# Patient Record
Sex: MALE | Race: BLACK OR AFRICAN AMERICAN | Employment: FULL TIME | URBAN - METROPOLITAN AREA
[De-identification: names, ages, dates, MRNs, and addresses within clinical notes are randomized per-mention and may not be internally consistent; named-entity substitution may affect disease eponyms.]

---

## 2017-06-05 ENCOUNTER — TELEPHONE (OUTPATIENT)
Dept: FAMILY MEDICINE CLINIC | Age: 52
End: 2017-06-05

## 2017-06-05 NOTE — TELEPHONE ENCOUNTER
Place call to Moris Keller states he was seen at [facility] ED on 06-. He states while he was working out at [employer] he twisted his left foot. For two days left foot was very painful to walk on. The ED states he has a cyst located on his left foot. Moris Keller was advised to obtain a copy of x-ray report along with the disc for Dr. Robie Libman review. He was also advised that Dr. Robie Libman may have to see him before any images may be order.  Please advise

## 2017-06-05 NOTE — TELEPHONE ENCOUNTER
Please follow-up in office to examine and to see x-rays. I also would like the report of the x-ray to see what the radiologist says. We can determine any testing when we see him.

## 2017-06-05 NOTE — TELEPHONE ENCOUNTER
Spoke with Jose Manuel Forrester he states he will call and have the x-ray results faxed over or go by and  from [facility]. He is aware that Dr. Rodo Cardenas will determined at that time what further images need to be ordered.

## 2017-06-05 NOTE — TELEPHONE ENCOUNTER
Pt states that he hurt his left foot and went to [facility] . He had an Xray done and it showed that he had a cyst on his foot. The ED states that it was not able to do a CT or MRI that he needed to see his PCP.  Please advise

## 2017-06-13 ENCOUNTER — OFFICE VISIT (OUTPATIENT)
Dept: FAMILY MEDICINE CLINIC | Age: 52
End: 2017-06-13

## 2017-06-13 VITALS
BODY MASS INDEX: 26.34 KG/M2 | HEIGHT: 70 IN | RESPIRATION RATE: 16 BRPM | TEMPERATURE: 98.8 F | DIASTOLIC BLOOD PRESSURE: 74 MMHG | SYSTOLIC BLOOD PRESSURE: 122 MMHG | HEART RATE: 69 BPM | WEIGHT: 184 LBS | OXYGEN SATURATION: 99 %

## 2017-06-13 DIAGNOSIS — E78.00 HYPERCHOLESTEROLEMIA: ICD-10-CM

## 2017-06-13 DIAGNOSIS — M95.8 OSTEOCHONDRAL DEFECT OF TALUS: ICD-10-CM

## 2017-06-13 DIAGNOSIS — S86.012A ACHILLES RUPTURE, LEFT, INITIAL ENCOUNTER: Primary | ICD-10-CM

## 2017-06-13 DIAGNOSIS — Z12.5 PROSTATE CANCER SCREENING: ICD-10-CM

## 2017-06-13 RX ORDER — IBUPROFEN 600 MG/1
800 TABLET ORAL
COMMUNITY
Start: 2017-06-04 | End: 2017-07-25

## 2017-06-13 RX ORDER — ATORVASTATIN CALCIUM 40 MG/1
40 TABLET, FILM COATED ORAL DAILY
Qty: 90 TAB | Refills: 0 | Status: SHIPPED | OUTPATIENT
Start: 2017-06-13 | End: 2017-07-25 | Stop reason: SDUPTHER

## 2017-06-13 NOTE — PROGRESS NOTES
1. Have you been to the ER, urgent care clinic since your last visit? Hospitalized since your last visit? Yes When: June 04,2017 Where: [facility] Reason for visit: foot/ankle pain    2. Have you seen or consulted any other health care providers outside of the [address] [place] [address] since your last visit? Include any pap smears or colon screening.  No

## 2017-06-13 NOTE — MR AVS SNAPSHOT
Visit Information Date & Time Provider Department Dept. Phone Encounter #  
 6/13/2017  8:15 AM Alem Modi, [address] [id number] Follow-up Instructions Return in about 5 weeks (around 7/19/2017) for complete physical, have 10 hour fasting labs 3-7 days prior and  follow up by phone or my-chart for . Your Appointments 7/19/2017  6:00 PM  
ROUTINE CARE with Alem Modi MD  
[address] [facility] ([facility]) Appt Note: [address] [facility] [address]  
[address] [facility] [address] Upcoming Health Maintenance Date Due INFLUENZA AGE 9 TO ADULT 8/1/2017 DTaP/Tdap/Td series (2 - Td) 2/24/2021 Allergies as of 6/13/2017  Review Complete On: 6/13/2017 By: Alem Modi MD  
 No Known Allergies Current Immunizations  Reviewed on 6/13/2017 Name Date TDAP Vaccine 2/24/2011 Reviewed by Chiquita Shell on 6/13/2017 at  8:24 AM  
You Were Diagnosed With   
  
 Codes Comments Achilles rupture, left, initial encounter    -  Primary ICD-10-CM: M77.097A ICD-9-CM: 845.09 Osteochondral defect of talus     ICD-10-CM: M95.8 ICD-9-CM: 738.8 Hypercholesterolemia     ICD-10-CM: E78.00 ICD-9-CM: 272.0 Prostate cancer screening     ICD-10-CM: Z12.5 ICD-9-CM: V76.44 Vitals BP Pulse Temp Resp Height(growth percentile) Weight(growth percentile) 122/74 (BP 1 Location: Right arm, BP Patient Position: Sitting) 69 98.8 °F (37.1 °C) (Oral) 16 5' 9.5\" (1.765 m) 184 lb (83.5 kg) SpO2 BMI Smoking Status 99% 26.78 kg/m2 Never Smoker Vitals History BMI and BSA Data Body Mass Index Body Surface Area  
 26.78 kg/m 2 2.02 m 2 Your Updated Medication List  
  
   
This list is accurate as of: 6/13/17  8:59 AM.  Always use your most recent med list.  
  
  
  
  
 atorvastatin 40 mg tablet Commonly known as:  LIPITOR Take 1 Tab by mouth daily. ibuprofen 600 mg tablet Commonly known as:  MOTRIN Take 800 mg by mouth every eight (8) hours as needed. Prescriptions Printed Refills  
 atorvastatin (LIPITOR) 40 mg tablet 0 Sig: Take 1 Tab by mouth daily. Class: Print Route: Oral  
  
We Performed the Following REFERRAL TO ORTHOPEDICS [KDY129 Custom] Comments:  
 Please evaluate for possible achilles rupture Follow-up Instructions Return in about 5 weeks (around 7/19/2017) for complete physical, have 10 hour fasting labs 3-7 days prior and  follow up by phone or my-chart for . To-Do List   
 06/13/2017 Lab:  CBC W/O DIFF   
  
 06/13/2017 Lab:  LIPID PANEL   
  
 06/13/2017 Lab:  METABOLIC PANEL, COMPREHENSIVE   
  
 06/13/2017 Imaging:  MRI ANKLE LT WO CONT   
  
 06/13/2017 Lab:  PSA W/ REFLX FREE PSA Referral Information Referral ID Referred By Referred To  
  
 [id number] [address] [address] [place], [address] [place] [address]. Phone: [phone] Fax: [phone] Visits Status Start Date End Date 1 New Request 6/13/17 6/13/18 If your referral has a status of pending review or denied, additional information will be sent to support the outcome of this decision. Patient Instructions Achilles Tendon Tear: Care Instructions Your Care Instructions You have ruptured or torn your Achilles tendon. The Achilles tendon (also called the heel cord) connects the calf muscles on the back of the lower leg to the bone at the base of the heel. Treatment for an Achilles tendon injury depends on whether the tendon has been partially torn or completely ruptured. A cast or splint can often treat a partial tear. If your tendon has ruptured, you may need surgery. You and your orthopedic doctor will choose a treatment plan, so it is important to go to any follow-up appointments. Follow-up care is a key part of your treatment and safety. Be sure to make and go to all appointments, and call your doctor if you are having problems. Its also a good idea to know your test results and keep a list of the medicines you take. How can you care for yourself at home? · Prop up the sore foot on a pillow anytime you sit or lie down during the next 3 days. Try to keep it above the level of your heart. This will help reduce swelling. · Take pain medicines exactly as directed. ¨ If the doctor gave you a prescription medicine for pain, take it as prescribed. ¨ If you are not taking a prescription pain medicine, ask your doctor if you can take an over-the-counter medicine. · Do not put weight on the affected foot until your doctor says you can. Use crutches or a walker. · Wear the splint or cast as directed until your doctor says you can remove it. When should you call for help? Call 911 anytime you think you may need emergency care. For example, call if: 
· You have sudden chest pain and shortness of breath, or you cough up blood. Call your doctor now or seek immediate medical care if: 
· You have increased or severe pain. · Your foot is cool or pale or changes color. · You have tingling, weakness, or numbness in your toes. · Your cast or splint feels too tight. · You cannot move your toes. · You have a fever, or there is drainage or a bad smell coming from the cast. 
· You have signs of a blood clot, such as: 
¨ Pain in your calf, back of the knee, thigh, or groin. ¨ Redness or swelling in your leg. · The skin under your cast or splint burns or stings. Watch closely for changes in your health, and be sure to contact your doctor if: 
· You do not get better as expected. Where can you learn more? Go to http://rina.info/. Enter F495 in the search box to learn more about \"Achilles Tendon Tear: Care Instructions. \" Current as of: May 23, 2016 Content Version: 11.2 © 9352-7098 [employer]. Care instructions adapted under license by [employer] (which disclaims liability or warranty for this information). If you have questions about a medical condition or this instruction, always ask your healthcare professional. [address] any warranty or liability for your use of this information. Please keep scheduled appointment with Dr. Robie Libman on 07- for complete physical, have 10 hour fasting labs 3-7 days prior and  follow up by phone or my-chart for MRI results Introducing [facility]! Laura Lu introduces [employer]ISHCLOUDS patient portal. Now you can access parts of your medical record, email your doctor's office, and request medication refills online. 1. In your internet browser, go to [email or web address] 2. Click on the First Time User? Click Here link in the Sign In box. You will see the New Member Sign Up page. 3. Enter your WISHCLOUDS Access Code exactly as it appears below. You will not need to use this code after youve completed the sign-up process. If you do not sign up before the expiration date, you must request a new code. · WISHCLOUDS Access Code: [email or web address] Expires: 9/11/2017  8:51 AM 
 
4. Enter the last four digits of your Social Security Number (xxxx) and Date of Birth (mm/dd/yyyy) as indicated and click Submit. You will be taken to the next sign-up page. 5. Create a WISHCLOUDS ID. This will be your WISHCLOUDS login ID and cannot be changed, so think of one that is secure and easy to remember. 6. Create a WISHCLOUDS password. You can change your password at any time. 7. Enter your Password Reset Question and Answer. This can be used at a later time if you forget your password. 8. Enter your e-mail address. You will receive e-mail notification when new information is available in [address] 9. Click Sign Up. You can now view and download portions of your medical record. 10. Click the Download Summary menu link to download a portable copy of your medical information. If you have questions, please visit the Frequently Asked Questions section of the Imgur website. Remember, Imgur is NOT to be used for urgent needs. For medical emergencies, dial 911. Now available from your iPhone and Android! Please provide this summary of care documentation to your next provider. Your primary care clinician is listed as Manda Nelson. If you have any questions after today's visit, please call [phone].

## 2017-06-13 NOTE — PROGRESS NOTES
SUBJECTIVE  Chief Complaint   Patient presents with    Ankle Pain     follow up [facility] for left foot/ankle pain and to review results     The patient presents complaining of a history of a 9 day history of acute onset of left ankle pain sustained as he jumped. The patient describes the pain as sudden onset and moderate. The pain is located anteriorly and posteriorly. The patient reports the pain has been controlled by NSAIDs and in a boot. Aggravating factors:  Walking   Relieving factors:  Boot   Radiation:  denies  Swelling:  Yes and bruising in heel (mild)  The patient has tried going to [facility] for evaluation at onset. He had an x-ray suggesting an osteochondral defect of the medial portion of the talar dome. He has not had any pain localized there he says. A few years ago he had a contralateral side Achilles rupture repaired by Dr. Iliana Cotton. OBJECTIVE    Blood pressure 122/74, pulse 69, temperature 98.8 °F (37.1 °C), temperature source Oral, resp. rate 16, height 5' 9.5\" (1.765 m), weight 184 lb (83.5 kg), SpO2 99 %. General:  alert, cooperative, well appearing, in no apparent distress. Left Ankle: There is normal range of motion in the ankle. There is swelling of the ankle and of the Achilles. There is tenderness of the Achilles tendon. The tendon is with a defect in the middle portion. Garcias test reveals no motion at the ankle. The proximal squeeze test is negative. There is no tenderness along the distal tibia or fibula. There is no tenderness along the proximal fifth metatarsal or the navicular bone. There is no lateral or medial ankle swelling. There is no tenderness of the medial or lateral ligaments of the ankle. Neuro:  Gait is assisted in a boot. Skin:  There are no local rashes. No erythema but there is bruising along the heel. No warmth. Psych: normal affect. Mood good. Oriented x 3. Judgement and insight intact.     ASSESSMENT / PLAN    ICD-10-CM ICD-9-CM    1. Achilles rupture, left, initial encounter S86.012A 845.09 ibuprofen (MOTRIN) 600 mg tablet      REFERRAL TO ORTHOPEDICS      MRI ANKLE LT WO CONT   2. Osteochondral defect of talus M95.8 738.8 ibuprofen (MOTRIN) 600 mg tablet      REFERRAL TO ORTHOPEDICS      MRI ANKLE LT WO CONT   3. Hypercholesterolemia E78.00 272.0 atorvastatin (LIPITOR) 40 mg tablet      CBC W/O DIFF      METABOLIC PANEL, COMPREHENSIVE      LIPID PANEL   4. Prostate cancer screening Z12.5 V76.44 PSA W/ REFLX FREE PSA     Achilles rupture - evaluate with MRI. Ideally I would have sent him over to see his ankle surgeon today or tomorrow first however [facility]'s referral system will not allow that. We will thus proceed with the MRI and encourage him to do this ASAP. Osteochondral defect - should be better characterized with the MRI hopefully. Hypercholesterolemia - ordered labs. Due for CPE in July. Timing of labs dependent on MRI findings and if he will have surgery. All chart history elements were reviewed by me at the time of the visit even though marked at time of note closure. Patient understands our medical plan. Patient has provided input and agrees with goals. Alternatives have been explained and offered. All questions answered. The patient is to call if condition worsens or fails to improve. Follow-up Disposition:  Return in about 5 weeks (around 7/19/2017) for complete physical, have 10 hour fasting labs 3-7 days prior and  follow up by phone or my-chart for .

## 2017-06-13 NOTE — PATIENT INSTRUCTIONS
Achilles Tendon Tear: Care Instructions  Your Care Instructions    You have ruptured or torn your Achilles tendon. The Achilles tendon (also called the heel cord) connects the calf muscles on the back of the lower leg to the bone at the base of the heel. Treatment for an Achilles tendon injury depends on whether the tendon has been partially torn or completely ruptured. A cast or splint can often treat a partial tear. If your tendon has ruptured, you may need surgery. You and your orthopedic doctor will choose a treatment plan, so it is important to go to any follow-up appointments. Follow-up care is a key part of your treatment and safety. Be sure to make and go to all appointments, and call your doctor if you are having problems. Its also a good idea to know your test results and keep a list of the medicines you take. How can you care for yourself at home? · Prop up the sore foot on a pillow anytime you sit or lie down during the next 3 days. Try to keep it above the level of your heart. This will help reduce swelling. · Take pain medicines exactly as directed. ¨ If the doctor gave you a prescription medicine for pain, take it as prescribed. ¨ If you are not taking a prescription pain medicine, ask your doctor if you can take an over-the-counter medicine. · Do not put weight on the affected foot until your doctor says you can. Use crutches or a walker. · Wear the splint or cast as directed until your doctor says you can remove it. When should you call for help? Call 911 anytime you think you may need emergency care. For example, call if:  · You have sudden chest pain and shortness of breath, or you cough up blood. Call your doctor now or seek immediate medical care if:  · You have increased or severe pain. · Your foot is cool or pale or changes color. · You have tingling, weakness, or numbness in your toes. · Your cast or splint feels too tight. · You cannot move your toes.   · You have a fever, or there is drainage or a bad smell coming from the cast.  · You have signs of a blood clot, such as:  ¨ Pain in your calf, back of the knee, thigh, or groin. ¨ Redness or swelling in your leg. · The skin under your cast or splint burns or stings. Watch closely for changes in your health, and be sure to contact your doctor if:  · You do not get better as expected. Where can you learn more? Go to http://archana-kaz.info/. Enter F495 in the search box to learn more about \"Achilles Tendon Tear: Care Instructions. \"  Current as of: May 23, 2016  Content Version: 11.2  © 4422-5623 [employer]. Care instructions adapted under license by [employer] (which disclaims liability or warranty for this information). If you have questions about a medical condition or this instruction, always ask your healthcare professional. [address] any warranty or liability for your use of this information.   Please keep scheduled appointment with Dr. Mayur Snider on 07- for complete physical, have 10 hour fasting labs 3-7 days prior and  follow up by phone or my-chart for MRI results

## 2017-06-16 ENCOUNTER — DOCUMENTATION ONLY (OUTPATIENT)
Dept: ORTHOPEDIC SURGERY | Age: 52
End: 2017-06-16

## 2017-06-16 ENCOUNTER — HOSPITAL ENCOUNTER (OUTPATIENT)
Age: 52
Discharge: HOME | End: 2017-06-16
Attending: FAMILY MEDICINE
Payer: OTHER GOVERNMENT

## 2017-06-16 DIAGNOSIS — S86.012A ACHILLES RUPTURE, LEFT, INITIAL ENCOUNTER: ICD-10-CM

## 2017-06-16 DIAGNOSIS — M95.8 OSTEOCHONDRAL DEFECT OF TALUS: ICD-10-CM

## 2017-06-16 PROCEDURE — 73721 MRI JNT OF LWR EXTRE W/O DYE: CPT

## 2017-06-16 NOTE — PROGRESS NOTES
Patient aware of results. I am awaiting to hear from Dr. Dani Daigle about timeline of when he must be seen. He is scheduled for July but I imagine he will want to see him sooner.

## 2017-06-19 ENCOUNTER — OFFICE VISIT (OUTPATIENT)
Dept: ORTHOPEDIC SURGERY | Facility: CLINIC | Age: 52
End: 2017-06-19

## 2017-06-19 ENCOUNTER — HOSPITAL ENCOUNTER (OUTPATIENT)
Dept: LAB | Age: 52
Discharge: HOME | End: 2017-06-19
Payer: OTHER GOVERNMENT

## 2017-06-19 ENCOUNTER — HOSPITAL ENCOUNTER (OUTPATIENT)
Dept: GENERAL RADIOLOGY | Age: 52
Discharge: HOME | End: 2017-06-19
Payer: OTHER GOVERNMENT

## 2017-06-19 VITALS
DIASTOLIC BLOOD PRESSURE: 94 MMHG | HEART RATE: 72 BPM | TEMPERATURE: 97.9 F | HEIGHT: 70 IN | SYSTOLIC BLOOD PRESSURE: 126 MMHG | BODY MASS INDEX: 26.66 KG/M2 | WEIGHT: 186.2 LBS

## 2017-06-19 DIAGNOSIS — S86.012A RUPTURE OF LEFT ACHILLES TENDON, INITIAL ENCOUNTER: ICD-10-CM

## 2017-06-19 DIAGNOSIS — S86.012A RUPTURE OF LEFT ACHILLES TENDON, INITIAL ENCOUNTER: Primary | ICD-10-CM

## 2017-06-19 DIAGNOSIS — Z01.810 PRE-OPERATIVE CARDIOVASCULAR EXAMINATION: ICD-10-CM

## 2017-06-19 DIAGNOSIS — S86.011D: ICD-10-CM

## 2017-06-19 DIAGNOSIS — Z01.812 BLOOD TESTS PRIOR TO TREATMENT OR PROCEDURE: ICD-10-CM

## 2017-06-19 DIAGNOSIS — Z01.811 PRE-OPERATIVE RESPIRATORY EXAMINATION: ICD-10-CM

## 2017-06-19 DIAGNOSIS — M95.8 OSTEOCHONDRAL DEFECT OF ANKLE: ICD-10-CM

## 2017-06-19 DIAGNOSIS — M25.572 ACUTE LEFT ANKLE PAIN: ICD-10-CM

## 2017-06-19 DIAGNOSIS — S86.012A ACHILLES RUPTURE, LEFT, INITIAL ENCOUNTER: Primary | ICD-10-CM

## 2017-06-19 DIAGNOSIS — E78.00 HYPERCHOLESTEROLEMIA: ICD-10-CM

## 2017-06-19 LAB
ANION GAP BLD CALC-SCNC: 7 MMOL/L (ref 3–18)
BASOPHILS # BLD AUTO: 0 K/UL (ref 0–0.1)
BASOPHILS # BLD: 0 % (ref 0–2)
BUN SERPL-MCNC: 10 MG/DL (ref 7–18)
BUN/CREAT SERPL: 10 (ref 12–20)
CALCIUM SERPL-MCNC: 9 MG/DL (ref 8.5–10.1)
CHLORIDE SERPL-SCNC: 104 MMOL/L (ref 100–108)
CO2 SERPL-SCNC: 29 MMOL/L (ref 21–32)
CREAT SERPL-MCNC: 0.96 MG/DL (ref 0.6–1.3)
DIFFERENTIAL METHOD BLD: ABNORMAL
EOSINOPHIL # BLD: 0.1 K/UL (ref 0–0.4)
EOSINOPHIL NFR BLD: 1 % (ref 0–5)
ERYTHROCYTE [DISTWIDTH] IN BLOOD BY AUTOMATED COUNT: 13.3 % (ref 11.6–14.5)
GLUCOSE SERPL-MCNC: 77 MG/DL (ref 74–99)
HCT VFR BLD AUTO: 37.7 % (ref 36–48)
HGB BLD-MCNC: 12.9 G/DL (ref 13–16)
LYMPHOCYTES # BLD AUTO: 35 % (ref 21–52)
LYMPHOCYTES # BLD: 2 K/UL (ref 0.9–3.6)
MCH RBC QN AUTO: 29.5 PG (ref 24–34)
MCHC RBC AUTO-ENTMCNC: 34.2 G/DL (ref 31–37)
MCV RBC AUTO: 86.3 FL (ref 74–97)
MONOCYTES # BLD: 0.5 K/UL (ref 0.05–1.2)
MONOCYTES NFR BLD AUTO: 9 % (ref 3–10)
NEUTS SEG # BLD: 3.2 K/UL (ref 1.8–8)
NEUTS SEG NFR BLD AUTO: 55 % (ref 40–73)
PLATELET # BLD AUTO: 263 K/UL (ref 135–420)
PMV BLD AUTO: 10.2 FL (ref 9.2–11.8)
POTASSIUM SERPL-SCNC: 4 MMOL/L (ref 3.5–5.5)
RBC # BLD AUTO: 4.37 M/UL (ref 4.7–5.5)
SODIUM SERPL-SCNC: 140 MMOL/L (ref 136–145)
WBC # BLD AUTO: 5.8 K/UL (ref 4.6–13.2)

## 2017-06-19 PROCEDURE — 93005 ELECTROCARDIOGRAM TRACING: CPT

## 2017-06-19 PROCEDURE — 71020 XR CHEST PA LAT: CPT

## 2017-06-19 NOTE — PROGRESS NOTES
Patient: Angelito Castro                MRN: [id number]       SSN: [id number]  YOB: 1965        AGE: 54 y.o. SEX: male    PCP: Gertrudis Antunez MD  06/19/17    Chief Complaint   Patient presents with    Foot Pain     Left     HISTORY:  Angelito Castro is a 54 y.o. male who is seen for left ankle pain. The patient states that he was running at the gym on 6/3/17 and jumped and felt pain in his lower left leg. He notes that the pain occurred right as he was pushing off of his left leg. He states that he was stretching out and getting ready to play basketball. He does not recall hearing a pop but he describes it as the air going out of his tire. [facility] on 6/4/2017. He had an open achilles tendon repair by Dr. Venessa Sebastian in 2013. 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 He is retired from 25 years of service from the [employer]. He lives at home in [place] with his 23year old daughter and 13year old son. His daughter is a student at [employer]. He state that he exercises regularly. Current weight is 186 pounds. He is 5'9.5\" tall. He is hypertensive. He is not diabetic. No results found for: HBA1C, HGBE8, GLN2ZPXU, VMG6LNGH, OUU8URNV  Weight Metrics 6/19/2017 6/13/2017 5/16/2016 5/14/2015 5/12/2014 4/16/2013 4/10/2013   Weight 186 lb 3.2 oz 184 lb 189 lb 185 lb 187 lb 180 lb 182 lb   BMI 27.1 kg/m2 26.78 kg/m2 27.52 kg/m2 26.94 kg/m2 27.23 kg/m2 26.21 kg/m2 26.5 kg/m2     PHYSICAL EXAM:  Visit Vitals    BP (!) 126/94    Pulse 72    Temp 97.9 °F (36.6 °C) (Oral)    Ht 5' 9.5\" (1.765 m)    Wt 186 lb 3.2 oz (84.5 kg)    BMI 27.1 kg/m2       Appearance: Alert, well appearing and pleasant patient who is in no distress, oriented to person, place/time, and who follows commands. HEENT: Angelito Castro hears well, does not require hearing aids. His sclera of the eyes are non-icteric.  Angelito Castro is breathing normally and no respiratory accessory muscle use is noted. No JVD present and Neck ROM within normal limits. Psychiatric: Affect and mood are appropriate. Oriented x3  Heart[de-identified]  S1, S2 without murmer, regular rhythm  Lungs:  Breath sounds clear to auscultation  Cardiovascular/Peripheral Vascular: Normal pulses to each foot. Integumentary: No rashes,  wounds, or abrasions. Warm and normal color. No drainage. Gait: normal  Sensory Exam: Intact/Normal Sensation    Lymphatic: No evidence of Lymphedema  Vascular:       Pulses: palpable  Varicosities none  Wounds/Abrasion: None Present  Neuro: Negative, no tremors  Patient Active Problem List   Diagnosis Code    Hypercholesterolemia E78.00     REVIEW OF SYSTEMS: All Below are Negative except: See HPI   Constitutional: negative for fever, chills, and weight loss. Cardiovascular: negative for chest pain, claudication, leg swelling, SOB, FERNANDEZ   Gastrointestinal: Negative for pain, N/V/C/D, Blood in stool or urine, dysuria,  hematuria, incontinence, pelvic pain. Musculoskeletal: See HPI   Neurological: Negative for dizziness and weakness. Negative for headaches, Visual changes, confusion, seizures   Phychiatric/Behavioral: Negative for depression, memory loss, substance  abuse. Extremities: Negative for hair changes, rash, or skin lesion changes. Hematologic: Negative for bleeding problems, bruising, pallor or swollen lymph  nodes   Peripheral Vascular: No calf pain, no circulation deficits.     Social History     Social History    Marital status:      Spouse name: N/A    Number of children: N/A    Years of education: N/A     Occupational History    professional logistics      Social History Main Topics    Smoking status: Never Smoker    Smokeless tobacco: Never Used    Alcohol use No    Drug use: No    Sexual activity: Yes     Partners: Female     Other Topics Concern    Not on file     Social History Narrative      No Known Allergies   Current Outpatient Prescriptions   Medication Sig    atorvastatin (LIPITOR) 40 mg tablet Take 1 Tab by mouth daily.  ibuprofen (MOTRIN) 600 mg tablet Take 800 mg by mouth every eight (8) hours as needed. No current facility-administered medications for this visit. PHYSICAL EXAMINATION:  Visit Vitals    BP (!) 126/94    Pulse 72    Temp 97.9 °F (36.6 °C) (Oral)    Ht 5' 9.5\" (1.765 m)    Wt 186 lb 3.2 oz (84.5 kg)    BMI 27.1 kg/m2      ORTHO EXAMINATION:  Examination Right Ankle/Foot Left Ankle/Foot   Skin Intact Intact   Swelling - ++   Dorsiflexion 10 0   Plantarflexion 25 15   Deformity - +, ruptured achilles   Inversion laxity - -   Anterior drawer - -   Medial tenderness - -   Lateral tenderness - -   Heel cord repaired Ruptured achilles   Sensation Intact Intact   Bunion - -   Toe nails Normal Normal   Capillary refill Normal Normal   Garcia test positive left ankle     MRI ANKLE LEFT WO CONT 6/16/17  IMPRESSION: Confirmed Achilles tendon rupture about myotendinous junction level,  with 1.5 cm gap. Additional findings as above. RADIOGRAPHS:  XR LEFT ANKLE 6/4/17  IMPRESSION:   1. No evidence of acute fracture or dislocation. 2. 5mm osteochondral injury within the medial talar dome of unknown clinical age. Outpatient ankle MRI and orthopedic consultation is recommended for further evaluation. IMPRESSION:      ICD-10-CM ICD-9-CM    1. Achilles rupture, left, initial encounter S86.012A 845.09    2. Hypercholesterolemia E78.00 272.0    3. Acute left ankle pain M25.572 719.47    4. Rupture Achilles tendon, right, subsequent encounter S86.011D V58.89      845.09    5. Osteochondral defect of ankle M95.8 738.8     Left talar     PLAN:  He will be scheduled for Achilles rupture repair. Risks of surgery outlined and informed consent obtained. He will follow up with his primary care physician for high blood pressure.       Scribed by Zaria Linares ([address]) as dictated by Sarah Brasher MD

## 2017-06-19 NOTE — MR AVS SNAPSHOT
Visit Information Date & Time Provider Department Dept. Phone Encounter #  
 6/19/2017  1:00 PM Joan Perez MD [facility] [place] [id number] [phone] [id number] Follow-up Instructions Return if symptoms worsen or fail to improve. Your Appointments 7/19/2017  6:00 PM  
ROUTINE CARE with Taisha Triplett MD  
[address] [facility] ([address]) Appt Note: [address] [facility] [address] [facility] [address]  
[address] [facility] [address] [facility] [address] Upcoming Health Maintenance Date Due INFLUENZA AGE 9 TO ADULT 8/1/2017 DTaP/Tdap/Td series (2 - Td) 2/24/2021 Allergies as of 6/19/2017  Review Complete On: 6/19/2017 By: Joan Perez MD  
 No Known Allergies Current Immunizations  Reviewed on 6/13/2017 Name Date TDAP Vaccine 2/24/2011 Not reviewed this visit You Were Diagnosed With   
  
 Codes Comments Achilles rupture, left, initial encounter    -  Primary ICD-10-CM: N17.336Z ICD-9-CM: 845.09 Hypercholesterolemia     ICD-10-CM: E78.00 ICD-9-CM: 272.0 Acute left ankle pain     ICD-10-CM: M25.572 ICD-9-CM: 719.47 Rupture Achilles tendon, right, subsequent encounter     ICD-10-CM: S86.011D ICD-9-CM: V58.89, 845.09 Osteochondral defect of ankle     ICD-10-CM: M95.8 ICD-9-CM: 738.8 Left talar Vitals BP Pulse Temp Height(growth percentile) Weight(growth percentile) BMI  
 (!) 126/94 72 97.9 °F (36.6 °C) (Oral) 5' 9.5\" (1.765 m) 186 lb 3.2 oz (84.5 kg) 27.1 kg/m2 Smoking Status Never Smoker BMI and BSA Data Body Mass Index Body Surface Area  
 27.1 kg/m 2 2.04 m 2 Your Updated Medication List  
  
   
This list is accurate as of: 6/19/17  1:39 PM.  Always use your most recent med list.  
  
  
  
  
 atorvastatin 40 mg tablet Commonly known as:  LIPITOR Take 1 Tab by mouth daily. ibuprofen 600 mg tablet Commonly known as:  MOTRIN Take 800 mg by mouth every eight (8) hours as needed. Follow-up Instructions Return if symptoms worsen or fail to improve. Patient Instructions Achilles Tendon Tear: Care Instructions Your Care Instructions You have ruptured or torn your Achilles tendon. The Achilles tendon (also called the heel cord) connects the calf muscles on the back of the lower leg to the bone at the base of the heel. Treatment for an Achilles tendon injury depends on whether the tendon has been partially torn or completely ruptured. A cast or splint can often treat a partial tear. If your tendon has ruptured, you may need surgery. You and your orthopedic doctor will choose a treatment plan, so it is important to go to any follow-up appointments. Follow-up care is a key part of your treatment and safety. Be sure to make and go to all appointments, and call your doctor if you are having problems. Its also a good idea to know your test results and keep a list of the medicines you take. How can you care for yourself at home? · Prop up the sore foot on a pillow anytime you sit or lie down during the next 3 days. Try to keep it above the level of your heart. This will help reduce swelling. · Take pain medicines exactly as directed. ¨ If the doctor gave you a prescription medicine for pain, take it as prescribed. ¨ If you are not taking a prescription pain medicine, ask your doctor if you can take an over-the-counter medicine. · Do not put weight on the affected foot until your doctor says you can. Use crutches or a walker. · Wear the splint or cast as directed until your doctor says you can remove it. When should you call for help? Call 911 anytime you think you may need emergency care. For example, call if: 
· You have sudden chest pain and shortness of breath, or you cough up blood. Call your doctor now or seek immediate medical care if: 
· You have increased or severe pain. · Your foot is cool or pale or changes color. · You have tingling, weakness, or numbness in your toes. · Your cast or splint feels too tight. · You cannot move your toes. · You have a fever, or there is drainage or a bad smell coming from the cast. 
· You have signs of a blood clot, such as: 
¨ Pain in your calf, back of the knee, thigh, or groin. ¨ Redness or swelling in your leg. · The skin under your cast or splint burns or stings. Watch closely for changes in your health, and be sure to contact your doctor if: 
· You do not get better as expected. Where can you learn more? Go to http://archana-kaz.info/. Enter F495 in the search box to learn more about \"Achilles Tendon Tear: Care Instructions. \" Current as of: May 23, 2016 Content Version: 11.2 © 1755-5037 [employer]. Care instructions adapted under license by [employer] (which disclaims liability or warranty for this information). If you have questions about a medical condition or this instruction, always ask your healthcare professional. [place] [address] any warranty or liability for your use of this information. Achilles Tendon Repair: Before Your Surgery What is an Achilles tendon repair? Achilles tendon repair reconnects the ends of the broken tendon. This lets you use your foot again in a normal way. You could have one of two types of surgery. In open surgery, the doctor makes a cut at the back of your leg. This cut is called an incision. In percutaneous (say \"per-jose maria-JAY-tyson-\") surgery, the doctor uses a few smaller cuts. Tools for fixing the tendon are inserted through the cuts. Your doctor will decide which surgery will work best for you. Most people go home the same day as the surgery.  How soon you can go back to work and your normal routine depends on your job. If you sit at work, you may be able to go back in 1 to 2 weeks. But if you are on your feet at work, it may take 6 to 8 weeks. If you are very physically active in your job, it may take 3 to 6 months. You will be in a cast or walking boot for 6 to 12 weeks after surgery. You may need physical therapy. Most people can return to sports in 4 to 6 months. Follow-up care is a key part of your treatment and safety. Be sure to make and go to all appointments, and call your doctor if you are having problems. It's also a good idea to know your test results and keep a list of the medicines you take. What happens before surgery? Surgery can be stressful. This information will help you understand what you can expect. And it will help you safely prepare for surgery. Preparing for surgery · Understand exactly what surgery is planned, along with the risks, benefits, and other options. · Tell your doctors ALL the medicines, vitamins, supplements, and herbal remedies you take. Some of these can increase the risk of bleeding or interact with anesthesia. · If you take blood thinners, such as warfarin (Coumadin), clopidogrel (Plavix), or aspirin, be sure to talk to your doctor. He or she will tell you if you should stop taking these medicines before your surgery. Make sure that you understand exactly what your doctor wants you to do. · Your doctor will tell you which medicines to take or stop before your surgery. You may need to stop taking certain medicines a week or more before surgery. So talk to your doctor as soon as you can. · If you have an advance directive, let your doctor know. It may include a living will and a durable power of  for health care. Bring a copy to the hospital. If you don't have one, you may want to prepare one. It lets your doctor and loved ones know your health care wishes.  Doctors advise that everyone prepare these papers before any type of surgery or procedure. What happens on the day of surgery? · Follow the instructions exactly about when to stop eating and drinking. If you don't, your surgery may be canceled. If your doctor told you to take your medicines on the day of surgery, take them with only a sip of water. · Take a bath or shower before you come in for your surgery. Do not apply lotions, perfumes, deodorants, or nail polish. · Do not shave the surgical site yourself. · Take off all jewelry and piercings. And take out contact lenses, if you wear them. At the hospital or surgery center · Bring a picture ID. · The area for surgery is often marked to make sure there are no errors. · You will be kept comfortable and safe by your anesthesia provider. The anesthesia may make you sleep. Or it may just numb the area being worked on. · The surgery will take 1 to 2 hours. · Your leg will be bandaged and raised. You may have a drain near your incision. · Your foot and ankle will be held in a cast or walking boot to limit motion. Going home · Be sure you have someone to drive you home. Anesthesia and pain medicine make it unsafe for you to drive. · You will be given more specific instructions about recovering from your surgery. They will cover things like diet, wound care, follow-up care, driving, and getting back to your normal routine. When should you call your doctor? · You have questions or concerns. · You don't understand how to prepare for your surgery. · You become ill before the surgery (such as fever, flu, or a cold). · You need to reschedule or have changed your mind about having the surgery. Where can you learn more? Go to http://archana-kaz.info/. Enter M732 in the search box to learn more about \"Achilles Tendon Repair: Before Your Surgery. \" Current as of: May 23, 2016 Content Version: 11.2 © 0784-3570 [employer]. Care instructions adapted under license by [employer] (which disclaims liability or warranty for this information). If you have questions about a medical condition or this instruction, always ask your healthcare professional. [address] any warranty or liability for your use of this information. Introducing \[facility] [place] [facility]\"" & HEALTH SERVICES! [employer] introduces Catglobe patient portal. Now you can access parts of your medical record, email your doctor's office, and request medication refills online. 1. In your internet browser, go to [email or web address] 2. Click on the First Time User? Click Here link in the Sign In box. You will see the New Member Sign Up page. 3. Enter your Catglobe Access Code exactly as it appears below. You will not need to use this code after youve completed the sign-up process. If you do not sign up before the expiration date, you must request a new code. · Catglobe Access Code: [email or web address] Expires: 9/11/2017  8:51 AM 
 
4. Enter the last four digits of your Social Security Number (xxxx) and Date of Birth (mm/dd/yyyy) as indicated and click Submit. You will be taken to the next sign-up page. 5. Create a Catglobe ID. This will be your Catglobe login ID and cannot be changed, so think of one that is secure and easy to remember. 6. Create a Catglobe password. You can change your password at any time. 7. Enter your Password Reset Question and Answer. This can be used at a later time if you forget your password. 8. Enter your e-mail address. You will receive e-mail notification when new information is available in [address] 9. Click Sign Up. You can now view and download portions of your medical record. 10. Click the Download Summary menu link to download a portable copy of your medical information.  
 
If you have questions, please visit the Frequently Asked Questions section of the [employer]. Remember, [employer]-Clients[employer] is NOT to be used for urgent needs. For medical emergencies, dial 911. Now available from your iPhone and Android! Please provide this summary of care documentation to your next provider. Your primary care clinician is listed as Julianne Taylor. If you have any questions after today's visit, please call [phone].

## 2017-06-19 NOTE — PATIENT INSTRUCTIONS
Achilles Tendon Tear: Care Instructions  Your Care Instructions    You have ruptured or torn your Achilles tendon. The Achilles tendon (also called the heel cord) connects the calf muscles on the back of the lower leg to the bone at the base of the heel. Treatment for an Achilles tendon injury depends on whether the tendon has been partially torn or completely ruptured. A cast or splint can often treat a partial tear. If your tendon has ruptured, you may need surgery. You and your orthopedic doctor will choose a treatment plan, so it is important to go to any follow-up appointments. Follow-up care is a key part of your treatment and safety. Be sure to make and go to all appointments, and call your doctor if you are having problems. Its also a good idea to know your test results and keep a list of the medicines you take. How can you care for yourself at home? · Prop up the sore foot on a pillow anytime you sit or lie down during the next 3 days. Try to keep it above the level of your heart. This will help reduce swelling. · Take pain medicines exactly as directed. ¨ If the doctor gave you a prescription medicine for pain, take it as prescribed. ¨ If you are not taking a prescription pain medicine, ask your doctor if you can take an over-the-counter medicine. · Do not put weight on the affected foot until your doctor says you can. Use crutches or a walker. · Wear the splint or cast as directed until your doctor says you can remove it. When should you call for help? Call 911 anytime you think you may need emergency care. For example, call if:  · You have sudden chest pain and shortness of breath, or you cough up blood. Call your doctor now or seek immediate medical care if:  · You have increased or severe pain. · Your foot is cool or pale or changes color. · You have tingling, weakness, or numbness in your toes. · Your cast or splint feels too tight. · You cannot move your toes.   · You have a fever, or there is drainage or a bad smell coming from the cast.  · You have signs of a blood clot, such as:  ¨ Pain in your calf, back of the knee, thigh, or groin. ¨ Redness or swelling in your leg. · The skin under your cast or splint burns or stings. Watch closely for changes in your health, and be sure to contact your doctor if:  · You do not get better as expected. Where can you learn more? Go to http://archana-kaz.info/. Enter F495 in the search box to learn more about \"Achilles Tendon Tear: Care Instructions. \"  Current as of: May 23, 2016  Content Version: 11.2  © 7508-1919 [employer]. Care instructions adapted under license by [employer] (which disclaims liability or warranty for this information). If you have questions about a medical condition or this instruction, always ask your healthcare professional. [place] [address] any warranty or liability for your use of this information. Achilles Tendon Repair: Before Your Surgery  What is an Achilles tendon repair? Achilles tendon repair reconnects the ends of the broken tendon. This lets you use your foot again in a normal way. You could have one of two types of surgery. In open surgery, the doctor makes a cut at the back of your leg. This cut is called an incision. In percutaneous (say \"per-jose maria-JAY-tyson-\") surgery, the doctor uses a few smaller cuts. Tools for fixing the tendon are inserted through the cuts. Your doctor will decide which surgery will work best for you. Most people go home the same day as the surgery. How soon you can go back to work and your normal routine depends on your job. If you sit at work, you may be able to go back in 1 to 2 weeks. But if you are on your feet at work, it may take 6 to 8 weeks. If you are very physically active in your job, it may take 3 to 6 months. You will be in a cast or walking boot for 6 to 12 weeks after surgery.  You may need physical therapy. Most people can return to sports in 4 to 6 months. Follow-up care is a key part of your treatment and safety. Be sure to make and go to all appointments, and call your doctor if you are having problems. It's also a good idea to know your test results and keep a list of the medicines you take. What happens before surgery? Surgery can be stressful. This information will help you understand what you can expect. And it will help you safely prepare for surgery. Preparing for surgery  · Understand exactly what surgery is planned, along with the risks, benefits, and other options. · Tell your doctors ALL the medicines, vitamins, supplements, and herbal remedies you take. Some of these can increase the risk of bleeding or interact with anesthesia. · If you take blood thinners, such as warfarin (Coumadin), clopidogrel (Plavix), or aspirin, be sure to talk to your doctor. He or she will tell you if you should stop taking these medicines before your surgery. Make sure that you understand exactly what your doctor wants you to do. · Your doctor will tell you which medicines to take or stop before your surgery. You may need to stop taking certain medicines a week or more before surgery. So talk to your doctor as soon as you can. · If you have an advance directive, let your doctor know. It may include a living will and a durable power of  for health care. Bring a copy to the hospital. If you don't have one, you may want to prepare one. It lets your doctor and loved ones know your health care wishes. Doctors advise that everyone prepare these papers before any type of surgery or procedure. What happens on the day of surgery? · Follow the instructions exactly about when to stop eating and drinking. If you don't, your surgery may be canceled. If your doctor told you to take your medicines on the day of surgery, take them with only a sip of water.   · Take a bath or shower before you come in for your surgery. Do not apply lotions, perfumes, deodorants, or nail polish. · Do not shave the surgical site yourself. · Take off all jewelry and piercings. And take out contact lenses, if you wear them. At the hospital or surgery center  · Bring a picture ID. · The area for surgery is often marked to make sure there are no errors. · You will be kept comfortable and safe by your anesthesia provider. The anesthesia may make you sleep. Or it may just numb the area being worked on. · The surgery will take 1 to 2 hours. · Your leg will be bandaged and raised. You may have a drain near your incision. · Your foot and ankle will be held in a cast or walking boot to limit motion. Going home  · Be sure you have someone to drive you home. Anesthesia and pain medicine make it unsafe for you to drive. · You will be given more specific instructions about recovering from your surgery. They will cover things like diet, wound care, follow-up care, driving, and getting back to your normal routine. When should you call your doctor? · You have questions or concerns. · You don't understand how to prepare for your surgery. · You become ill before the surgery (such as fever, flu, or a cold). · You need to reschedule or have changed your mind about having the surgery. Where can you learn more? Go to http://archana-kaz.info/. Enter M732 in the search box to learn more about \"Achilles Tendon Repair: Before Your Surgery. \"  Current as of: May 23, 2016  Content Version: 11.2  © 0053-0699 Healthwise, Incorporated. Care instructions adapted under license by [employer] (which disclaims liability or warranty for this information). If you have questions about a medical condition or this instruction, always ask your healthcare professional. [address] any warranty or liability for your use of this information.

## 2017-06-20 ENCOUNTER — OFFICE VISIT (OUTPATIENT)
Dept: ORTHOPEDIC SURGERY | Facility: CLINIC | Age: 52
End: 2017-06-20

## 2017-06-20 ENCOUNTER — DOCUMENTATION ONLY (OUTPATIENT)
Dept: ORTHOPEDIC SURGERY | Facility: CLINIC | Age: 52
End: 2017-06-20

## 2017-06-20 VITALS
HEART RATE: 67 BPM | WEIGHT: 186 LBS | SYSTOLIC BLOOD PRESSURE: 127 MMHG | BODY MASS INDEX: 26.63 KG/M2 | HEIGHT: 70 IN | DIASTOLIC BLOOD PRESSURE: 92 MMHG | TEMPERATURE: 97.6 F

## 2017-06-20 DIAGNOSIS — Z01.818 PREOP GENERAL PHYSICAL EXAM: Primary | ICD-10-CM

## 2017-06-20 DIAGNOSIS — M25.572 ACUTE LEFT ANKLE PAIN: ICD-10-CM

## 2017-06-20 LAB
ATRIAL RATE: 54 BPM
CALCULATED P AXIS, ECG09: 46 DEGREES
CALCULATED R AXIS, ECG10: 42 DEGREES
CALCULATED T AXIS, ECG11: 51 DEGREES
DIAGNOSIS, 93000: NORMAL
P-R INTERVAL, ECG05: 212 MS
Q-T INTERVAL, ECG07: 414 MS
QRS DURATION, ECG06: 86 MS
QTC CALCULATION (BEZET), ECG08: 392 MS
VENTRICULAR RATE, ECG03: 54 BPM

## 2017-06-20 RX ORDER — OXYCODONE AND ACETAMINOPHEN 7.5; 325 MG/1; MG/1
1 TABLET ORAL
Qty: 44 TAB | Refills: 0 | Status: SHIPPED | OUTPATIENT
Start: 2017-06-20 | End: 2017-07-25

## 2017-06-20 NOTE — PROGRESS NOTES
Mr. Irwin Salas returns for history and physical in preparation of upcoming left ankle Achilles tendon exploration with probable repair. Please see attached forms in Epic for history, physical and review of systems.

## 2017-06-20 NOTE — LETTER
NOTIFICATION RETURN TO WORK / SCHOOL 
 
6/20/2017 1:31 PM 
 
Mr. Jaswinder Kent [facility] [id number] To Whom It May Concern: 
 
Jaswinder Kent is currently under the care of [address] [place] [address]. He will be out of work for at least 6 weeks. If there are questions or concerns please have the patient contact our office.  
 
 
 
Sincerely, 
 
 
Lien Wright PA-C

## 2017-06-20 NOTE — H&P
History and Physical        54 year AA male seen in preparation for Left Achilles exploration with probable repair  Review of Systems   Constitutional: Positive for activity change (decreased ROM with pain at left ankle). Negative for chills, fatigue and fever. HENT: Negative for ear pain. Eyes: Negative for pain. Respiratory: Negative for shortness of breath. Cardiovascular: Negative. Gastrointestinal: Negative for nausea and vomiting. Endocrine: Negative. Genitourinary: Negative for flank pain. Musculoskeletal: Positive for gait problem, joint swelling and myalgias. Left ankle and foot   Skin: Negative. Allergic/Immunologic: Negative. Neurological: Positive for weakness (Left lower extremity (ankle) / (foot)). Hematological: Negative. Physical Exam   Nursing note and vitals reviewed. Constitutional: He is oriented to person, place, and time. He appears well-developed and well-nourished. HENT:   Head: Normocephalic and atraumatic. Eyes: Conjunctivae and EOM are normal. Pupils are equal, round, and reactive to light. Neck: Normal range of motion. Cardiovascular: Normal rate, regular rhythm, normal heart sounds and intact distal pulses. Pulmonary/Chest: Effort normal and breath sounds normal.   Musculoskeletal:        Left foot: There is deformity. Feet:    Neurological: He is alert and oriented to person, place, and time. Skin: Skin is warm and dry. Psychiatric: He has a normal mood and affect. His speech is normal and behavior is normal. Judgment and thought content normal. Cognition and memory are normal.       Left Achilles pain with probable internal derangement    Left Achilles exploration with probable repair    Risk / Benefit: Surgeon will discuss in \"face to face\" encounter on day of surgery.

## 2017-07-18 ENCOUNTER — HOSPITAL ENCOUNTER (OUTPATIENT)
Dept: LAB | Age: 52
Discharge: HOME | End: 2017-07-18
Payer: OTHER GOVERNMENT

## 2017-07-18 DIAGNOSIS — Z12.5 PROSTATE CANCER SCREENING: ICD-10-CM

## 2017-07-18 DIAGNOSIS — E78.00 HYPERCHOLESTEROLEMIA: ICD-10-CM

## 2017-07-18 LAB
ALBUMIN SERPL BCP-MCNC: 4.3 G/DL (ref 3.4–5)
ALBUMIN/GLOB SERPL: 1.4 {RATIO} (ref 0.8–1.7)
ALP SERPL-CCNC: 76 U/L (ref 45–117)
ALT SERPL-CCNC: 22 U/L (ref 16–61)
ANION GAP BLD CALC-SCNC: 6 MMOL/L (ref 3–18)
AST SERPL W P-5'-P-CCNC: 8 U/L (ref 15–37)
BILIRUB SERPL-MCNC: 0.7 MG/DL (ref 0.2–1)
BUN SERPL-MCNC: 11 MG/DL (ref 7–18)
BUN/CREAT SERPL: 12 (ref 12–20)
CALCIUM SERPL-MCNC: 9.1 MG/DL (ref 8.5–10.1)
CHLORIDE SERPL-SCNC: 102 MMOL/L (ref 100–108)
CHOLEST SERPL-MCNC: 137 MG/DL
CO2 SERPL-SCNC: 29 MMOL/L (ref 21–32)
CREAT SERPL-MCNC: 0.89 MG/DL (ref 0.6–1.3)
ERYTHROCYTE [DISTWIDTH] IN BLOOD BY AUTOMATED COUNT: 13.2 % (ref 11.6–14.5)
GLOBULIN SER CALC-MCNC: 3 G/DL (ref 2–4)
GLUCOSE SERPL-MCNC: 72 MG/DL (ref 74–99)
HCT VFR BLD AUTO: 37.3 % (ref 36–48)
HDLC SERPL-MCNC: 55 MG/DL (ref 40–60)
HDLC SERPL: 2.5 {RATIO} (ref 0–5)
HGB BLD-MCNC: 12.8 G/DL (ref 13–16)
LDLC SERPL CALC-MCNC: 69 MG/DL (ref 0–100)
LIPID PROFILE,FLP: NORMAL
MCH RBC QN AUTO: 29.6 PG (ref 24–34)
MCHC RBC AUTO-ENTMCNC: 34.3 G/DL (ref 31–37)
MCV RBC AUTO: 86.1 FL (ref 74–97)
PLATELET # BLD AUTO: 228 K/UL (ref 135–420)
PMV BLD AUTO: 10.2 FL (ref 9.2–11.8)
POTASSIUM SERPL-SCNC: 3.9 MMOL/L (ref 3.5–5.5)
PROT SERPL-MCNC: 7.3 G/DL (ref 6.4–8.2)
RBC # BLD AUTO: 4.33 M/UL (ref 4.7–5.5)
SODIUM SERPL-SCNC: 137 MMOL/L (ref 136–145)
TRIGL SERPL-MCNC: 65 MG/DL (ref ?–150)
VLDLC SERPL CALC-MCNC: 13 MG/DL
WBC # BLD AUTO: 5.5 K/UL (ref 4.6–13.2)

## 2017-07-18 PROCEDURE — 36415 COLL VENOUS BLD VENIPUNCTURE: CPT | Performed by: FAMILY MEDICINE

## 2017-07-18 PROCEDURE — 84153 ASSAY OF PSA TOTAL: CPT | Performed by: FAMILY MEDICINE

## 2017-07-18 PROCEDURE — 85027 COMPLETE CBC AUTOMATED: CPT | Performed by: FAMILY MEDICINE

## 2017-07-18 PROCEDURE — 80061 LIPID PANEL: CPT | Performed by: FAMILY MEDICINE

## 2017-07-18 PROCEDURE — 80053 COMPREHEN METABOLIC PANEL: CPT | Performed by: FAMILY MEDICINE

## 2017-07-20 LAB
PSA SERPL-MCNC: 0.7 NG/ML (ref 0–4)
REFLEX CRITERIA: NORMAL

## 2017-07-25 ENCOUNTER — OFFICE VISIT (OUTPATIENT)
Dept: FAMILY MEDICINE CLINIC | Age: 52
End: 2017-07-25

## 2017-07-25 VITALS
SYSTOLIC BLOOD PRESSURE: 138 MMHG | RESPIRATION RATE: 14 BRPM | TEMPERATURE: 98.2 F | DIASTOLIC BLOOD PRESSURE: 88 MMHG | HEIGHT: 70 IN | WEIGHT: 185 LBS | OXYGEN SATURATION: 98 % | HEART RATE: 76 BPM | BODY MASS INDEX: 26.48 KG/M2

## 2017-07-25 DIAGNOSIS — Z00.00 ROUTINE MEDICAL EXAM: Primary | ICD-10-CM

## 2017-07-25 DIAGNOSIS — S86.012D ACHILLES RUPTURE, LEFT, SUBSEQUENT ENCOUNTER: ICD-10-CM

## 2017-07-25 DIAGNOSIS — E78.00 HYPERCHOLESTEROLEMIA: ICD-10-CM

## 2017-07-25 RX ORDER — ATORVASTATIN CALCIUM 40 MG/1
40 TABLET, FILM COATED ORAL DAILY
Qty: 90 TAB | Refills: 3 | Status: SHIPPED | OUTPATIENT
Start: 2017-07-25 | End: 2018-07-25 | Stop reason: SDUPTHER

## 2017-07-25 NOTE — PATIENT INSTRUCTIONS
Well Visit, Ages 25 to 48: Care Instructions  Your Care Instructions  Physical exams can help you stay healthy. Your doctor has checked your overall health and may have suggested ways to take good care of yourself. He or she also may have recommended tests. At home, you can help prevent illness with healthy eating, regular exercise, and other steps. Follow-up care is a key part of your treatment and safety. Be sure to make and go to all appointments, and call your doctor if you are having problems. It's also a good idea to know your test results and keep a list of the medicines you take. How can you care for yourself at home? · Reach and stay at a healthy weight. This will lower your risk for many problems, such as obesity, diabetes, heart disease, and high blood pressure. · Get at least 30 minutes of physical activity on most days of the week. Walking is a good choice. You also may want to do other activities, such as running, swimming, cycling, or playing tennis or team sports. Discuss any changes in your exercise program with your doctor. · Do not smoke or allow others to smoke around you. If you need help quitting, talk to your doctor about stop-smoking programs and medicines. These can increase your chances of quitting for good. · Talk to your doctor about whether you have any risk factors for sexually transmitted infections (STIs). Having one sex partner (who does not have STIs and does not have sex with anyone else) is a good way to avoid these infections. · Use birth control if you do not want to have children at this time. Talk with your doctor about the choices available and what might be best for you. · Protect your skin from too much sun. When you're outdoors from 10 a.m. to 4 p.m., stay in the shade or cover up with clothing and a hat with a wide brim. Wear sunglasses that block UV rays. Even when it's cloudy, put broad-spectrum sunscreen (SPF 30 or higher) on any exposed skin.   · See a dentist one or two times a year for checkups and to have your teeth cleaned. · Wear a seat belt in the car. · Drink alcohol in moderation, if at all. That means no more than 2 drinks a day for men and 1 drink a day for women. Follow your doctor's advice about when to have certain tests. These tests can spot problems early. For everyone  · Cholesterol. Have the fat (cholesterol) in your blood tested after age 21. Your doctor will tell you how often to have this done based on your age, family history, or other things that can increase your risk for heart disease. · Blood pressure. Have your blood pressure checked during a routine doctor visit. Your doctor will tell you how often to check your blood pressure based on your age, your blood pressure results, and other factors. · Vision. Talk with your doctor about how often to have a glaucoma test.  · Diabetes. Ask your doctor whether you should have tests for diabetes. · Colon cancer. Have a test for colon cancer at age 48. You may have one of several tests. If you are younger than 48, you may need a test earlier if you have any risk factors. Risk factors include whether you already had a precancerous polyp removed from your colon or whether your parent, brother, sister, or child has had colon cancer. For women  · Breast exam and mammogram. Talk to your doctor about when you should have a clinical breast exam and a mammogram. Medical experts differ on whether and how often women under 50 should have these tests. Your doctor can help you decide what is right for you. · Pap test and pelvic exam. Begin Pap tests at age 24. A Pap test is the best way to find cervical cancer. The test often is part of a pelvic exam. Ask how often to have this test.  · Tests for sexually transmitted infections (STIs). Ask whether you should have tests for STIs. You may be at risk if you have sex with more than one person, especially if your partners do not wear condoms.   For men  · Tests for sexually transmitted infections (STIs). Ask whether you should have tests for STIs. You may be at risk if you have sex with more than one person, especially if you do not wear a condom. · Testicular cancer exam. Ask your doctor whether you should check your testicles regularly. · Prostate exam. Talk to your doctor about whether you should have a blood test (called a PSA test) for prostate cancer. Experts differ on whether and when men should have this test. Some experts suggest it if you are older than 39 and are -American or have a father or brother who got prostate cancer when he was younger than 72. When should you call for help? Watch closely for changes in your health, and be sure to contact your doctor if you have any problems or symptoms that concern you. Where can you learn more? Go to http://archana-kaz.info/. Enter P072 in the search box to learn more about \"Well Visit, Ages 25 to 48: Care Instructions. \"  Current as of: July 19, 2016  Content Version: 11.3  © 4056-1700 [employer]. Care instructions adapted under license by [employer] (which disclaims liability or warranty for this information). If you have questions about a medical condition or this instruction, always ask your healthcare professional. [place] [address] any warranty or liability for your use of this information. DASH Diet: Care Instructions  Your Care Instructions  The DASH diet is an eating plan that can help lower your blood pressure. DASH stands for Dietary Approaches to Stop Hypertension. Hypertension is high blood pressure. The DASH diet focuses on eating foods that are high in calcium, potassium, and magnesium. These nutrients can lower blood pressure. The foods that are highest in these nutrients are fruits, vegetables, low-fat dairy products, nuts, seeds, and legumes.  But taking calcium, potassium, and magnesium supplements instead of eating foods that are high in those nutrients does not have the same effect. The DASH diet also includes whole grains, fish, and poultry. The DASH diet is one of several lifestyle changes your doctor may recommend to lower your high blood pressure. Your doctor may also want you to decrease the amount of sodium in your diet. Lowering sodium while following the DASH diet can lower blood pressure even further than just the DASH diet alone. Follow-up care is a key part of your treatment and safety. Be sure to make and go to all appointments, and call your doctor if you are having problems. It's also a good idea to know your test results and keep a list of the medicines you take. How can you care for yourself at home? Following the DASH diet  · Eat 4 to 5 servings of fruit each day. A serving is 1 medium-sized piece of fruit, ½ cup chopped or canned fruit, 1/4 cup dried fruit, or 4 ounces (½ cup) of fruit juice. Choose fruit more often than fruit juice. · Eat 4 to 5 servings of vegetables each day. A serving is 1 cup of lettuce or raw leafy vegetables, ½ cup of chopped or cooked vegetables, or 4 ounces (½ cup) of vegetable juice. Choose vegetables more often than vegetable juice. · Get 2 to 3 servings of low-fat and fat-free dairy each day. A serving is 8 ounces of milk, 1 cup of yogurt, or 1 ½ ounces of cheese. · Eat 6 to 8 servings of grains each day. A serving is 1 slice of bread, 1 ounce of dry cereal, or ½ cup of cooked rice, pasta, or cooked cereal. Try to choose whole-grain products as much as possible. · Limit lean meat, poultry, and fish to 2 servings each day. A serving is 3 ounces, about the size of a deck of cards. · Eat 4 to 5 servings of nuts, seeds, and legumes (cooked dried beans, lentils, and split peas) each week. A serving is 1/3 cup of nuts, 2 tablespoons of seeds, or ½ cup of cooked beans or peas. · Limit fats and oils to 2 to 3 servings each day.  A serving is 1 teaspoon of vegetable oil or 2 tablespoons of salad dressing. · Limit sweets and added sugars to 5 servings or less a week. A serving is 1 tablespoon jelly or jam, ½ cup sorbet, or 1 cup of lemonade. · Eat less than 2,300 milligrams (mg) of sodium a day. If you limit your sodium to 1,500 mg a day, you can lower your blood pressure even more. Tips for success  · Start small. Do not try to make dramatic changes to your diet all at once. You might feel that you are missing out on your favorite foods and then be more likely to not follow the plan. Make small changes, and stick with them. Once those changes become habit, add a few more changes. · Try some of the following:  ¨ Make it a goal to eat a fruit or vegetable at every meal and at snacks. This will make it easy to get the recommended amount of fruits and vegetables each day. ¨ Try yogurt topped with fruit and nuts for a snack or healthy dessert. ¨ Add lettuce, tomato, cucumber, and onion to sandwiches. ¨ Combine a ready-made pizza crust with low-fat mozzarella cheese and lots of vegetable toppings. Try using tomatoes, squash, spinach, broccoli, carrots, cauliflower, and onions. ¨ Have a variety of cut-up vegetables with a low-fat dip as an appetizer instead of chips and dip. ¨ Sprinkle sunflower seeds or chopped almonds over salads. Or try adding chopped walnuts or almonds to cooked vegetables. ¨ Try some vegetarian meals using beans and peas. Add garbanzo or kidney beans to salads. Make burritos and tacos with mashed parekh beans or black beans. Where can you learn more? Go to http://archana-kaz.info/. Enter B628 in the search box to learn more about \"DASH Diet: Care Instructions. \"  Current as of: April 3, 2017  Content Version: 11.3  © 8955-9011 [employer]. Care instructions adapted under license by [employer] (which disclaims liability or warranty for this information).  If you have questions about a medical condition or this instruction, always ask your healthcare professional. [place] [address] any warranty or liability for your use of this information. Home Blood Pressure Test: About This Test  What is it? A home blood pressure test allows you to keep track of your blood pressure at home. Blood pressure is a measure of the force of blood against the walls of your arteries. Blood pressure readings include two numbers, such as 130/80 (say \"130 over 80\"). The first number is the systolic pressure. The second number is the diastolic pressure. Why is this test done? You may do this test at home to:  · Find out if you have high blood pressure. · Track your blood pressure if you have high blood pressure. · Track how well medicine is working to reduce high blood pressure. · Check how lifestyle changes, such as weight loss and exercise, are affecting blood pressure. How can you prepare for the test?  · Do not use caffeine, tobacco, or medicines known to raise blood pressure (such as nasal decongestant sprays) for at least 30 minutes before taking your blood pressure. · Do not exercise for at least 30 minutes before taking your blood pressure. What happens before the test?  Take your blood pressure while you feel comfortable and relaxed. Sit quietly with both feet on the floor for at least 5 minutes before the test.  What happens during the test?  · Sit with your arm slightly bent and resting on a table so that your upper arm is at the same level as your heart. · Roll up your sleeve or take off your shirt to expose your upper arm. · Wrap the blood pressure cuff around your upper arm so that the lower edge of the cuff is about 1 inch above the bend of your elbow. Proceed with the following steps depending on if you are using an automatic or manual pressure monitor.   Automatic blood pressure monitors  · Press the on/off button on the automatic monitor and wait until the ready-to-measure \"heart\" symbol appears next to zero in the display window. · Press the start button. The cuff will inflate and deflate by itself. · Your blood pressure numbers will appear on the screen. · Write your numbers in your log book, along with the date and time. Manual blood pressure monitors  · Place the earpieces of a stethoscope in your ears, and place the bell of the stethoscope over the artery, just below the cuff. · Close the valve on the rubber inflating bulb. · Squeeze the bulb rapidly with your opposite hand to inflate the cuff until the dial or column of mercury reads about 30 mm Hg higher than your usual systolic pressure. If you do not know your usual pressure, inflate the cuff to 210 mm Hg or until the pulse at your wrist disappears. · Open the pressure valve just slightly by twisting or pressing the valve on the bulb. · As you watch the pressure slowly fall, note the level on the dial at which you first start to hear a pulsing or tapping sound through the stethoscope. This is your systolic blood pressure. · Continue letting the air out slowly. The sounds will become muffled and will finally disappear. Note the pressure when the sounds completely disappear. This is your diastolic blood pressure. Let out all the remaining air. · Write your numbers in your log book, along with the date and time. What else should you know about the test?  Results for adults ages 25 and older (mm Hg):  · Normal (ideal): Systolic 543 or below. Diastolic 79 or below. · Prehypertension: Systolic 701 to 964. Diastolic 80 to 89. · Hypertension: Systolic 980 or above. Diastolic 90 or above. Follow-up care is a key part of your treatment and safety. Be sure to make and go to all appointments, and call your doctor if you are having problems. It's also a good idea to keep a list of the medicines you take. Where can you learn more? Go to http://archana-kaz.info/.   Enter C427 in the search box to learn more about \"Home Blood Pressure Test: About This Test.\"  Current as of: November 3, 2016  Content Version: 11.3  © 4511-7402 [employer]. Care instructions adapted under license by [employer] (which disclaims liability or warranty for this information). If you have questions about a medical condition or this instruction, always ask your healthcare professional. [place] [address] any warranty or liability for your use of this information.

## 2017-07-25 NOTE — PROGRESS NOTES
Chief Complaint   Patient presents with    Physical    Results     lab results review     1. Have you been to the ER, urgent care clinic since your last visit? Hospitalized since your last visit? No    2. Have you seen or consulted any other health care providers outside of the [address] [place] [address] since your last visit? Include any pap smears or colon screening.  Yes, Dr. Toni Singletary

## 2017-07-25 NOTE — MR AVS SNAPSHOT
Visit Information Date & Time Provider Department Dept. Phone Encounter #  
 7/25/2017  2:30 PM Edgar Munoz, [address] [id number] Follow-up Instructions Return in about 1 year (around 7/25/2018) for physical.  
  
Upcoming Health Maintenance Date Due INFLUENZA AGE 9 TO ADULT 8/1/2017 DTaP/Tdap/Td series (2 - Td) 2/24/2021 Allergies as of 7/25/2017  Review Complete On: 7/25/2017 By: Edgar Munoz MD  
 No Known Allergies Current Immunizations  Reviewed on 7/25/2017 Name Date TDAP Vaccine 2/24/2011 Reviewed by Edgar Munoz MD on 7/25/2017 at  2:50 PM  
You Were Diagnosed With   
  
 Codes Comments Routine medical exam    -  Primary ICD-10-CM: Z00.00 ICD-9-CM: V70.0 Hypercholesterolemia     ICD-10-CM: E78.00 ICD-9-CM: 272.0 Achilles rupture, left, subsequent encounter     ICD-10-CM: S86.012D ICD-9-CM: V58.89, 845.09 Vitals BP Pulse Temp Resp Height(growth percentile) Weight(growth percentile) 138/88 76 98.2 °F (36.8 °C) (Oral) 14 5' 9.5\" (1.765 m) 185 lb (83.9 kg) SpO2 BMI Smoking Status 98% 26.93 kg/m2 Never Smoker Vitals History BMI and BSA Data Body Mass Index Body Surface Area  
 26.93 kg/m 2 2.03 m 2 Your Updated Medication List  
  
   
This list is accurate as of: 7/25/17  2:56 PM.  Always use your most recent med list.  
  
  
  
  
 atorvastatin 40 mg tablet Commonly known as:  LIPITOR Take 1 Tab by mouth daily. Prescriptions Printed Refills  
 atorvastatin (LIPITOR) 40 mg tablet 3 Sig: Take 1 Tab by mouth daily. Class: Print Route: Oral  
  
Follow-up Instructions Return in about 1 year (around 7/25/2018) for physical.  
  
  
Patient Instructions Well Visit, Ages 25 to 48: Care Instructions Your Care Instructions Physical exams can help you stay healthy.  Your doctor has checked your overall health and may have suggested ways to take good care of yourself. He or she also may have recommended tests. At home, you can help prevent illness with healthy eating, regular exercise, and other steps. Follow-up care is a key part of your treatment and safety. Be sure to make and go to all appointments, and call your doctor if you are having problems. It's also a good idea to know your test results and keep a list of the medicines you take. How can you care for yourself at home? · Reach and stay at a healthy weight. This will lower your risk for many problems, such as obesity, diabetes, heart disease, and high blood pressure. · Get at least 30 minutes of physical activity on most days of the week. Walking is a good choice. You also may want to do other activities, such as running, swimming, cycling, or playing tennis or team sports. Discuss any changes in your exercise program with your doctor. · Do not smoke or allow others to smoke around you. If you need help quitting, talk to your doctor about stop-smoking programs and medicines. These can increase your chances of quitting for good. · Talk to your doctor about whether you have any risk factors for sexually transmitted infections (STIs). Having one sex partner (who does not have STIs and does not have sex with anyone else) is a good way to avoid these infections. · Use birth control if you do not want to have children at this time. Talk with your doctor about the choices available and what might be best for you. · Protect your skin from too much sun. When you're outdoors from 10 a.m. to 4 p.m., stay in the shade or cover up with clothing and a hat with a wide brim. Wear sunglasses that block UV rays. Even when it's cloudy, put broad-spectrum sunscreen (SPF 30 or higher) on any exposed skin. · See a dentist one or two times a year for checkups and to have your teeth cleaned. · Wear a seat belt in the car. · Drink alcohol in moderation, if at all. That means no more than 2 drinks a day for men and 1 drink a day for women. Follow your doctor's advice about when to have certain tests. These tests can spot problems early. For everyone · Cholesterol. Have the fat (cholesterol) in your blood tested after age 21. Your doctor will tell you how often to have this done based on your age, family history, or other things that can increase your risk for heart disease. · Blood pressure. Have your blood pressure checked during a routine doctor visit. Your doctor will tell you how often to check your blood pressure based on your age, your blood pressure results, and other factors. · Vision. Talk with your doctor about how often to have a glaucoma test. 
· Diabetes. Ask your doctor whether you should have tests for diabetes. · Colon cancer. Have a test for colon cancer at age 48. You may have one of several tests. If you are younger than 48, you may need a test earlier if you have any risk factors. Risk factors include whether you already had a precancerous polyp removed from your colon or whether your parent, brother, sister, or child has had colon cancer. For women · Breast exam and mammogram. Talk to your doctor about when you should have a clinical breast exam and a mammogram. Medical experts differ on whether and how often women under 50 should have these tests. Your doctor can help you decide what is right for you. · Pap test and pelvic exam. Begin Pap tests at age 24. A Pap test is the best way to find cervical cancer. The test often is part of a pelvic exam. Ask how often to have this test. 
· Tests for sexually transmitted infections (STIs). Ask whether you should have tests for STIs. You may be at risk if you have sex with more than one person, especially if your partners do not wear condoms. For men · Tests for sexually transmitted infections (STIs).  Ask whether you should have tests for STIs. You may be at risk if you have sex with more than one person, especially if you do not wear a condom. · Testicular cancer exam. Ask your doctor whether you should check your testicles regularly. · Prostate exam. Talk to your doctor about whether you should have a blood test (called a PSA test) for prostate cancer. Experts differ on whether and when men should have this test. Some experts suggest it if you are older than 39 and are -American or have a father or brother who got prostate cancer when he was younger than 72. When should you call for help? Watch closely for changes in your health, and be sure to contact your doctor if you have any problems or symptoms that concern you. Where can you learn more? Go to http://archana-kaz.info/. Enter P072 in the search box to learn more about \"Well Visit, Ages 25 to 48: Care Instructions. \" Current as of: July 19, 2016 Content Version: 11.3 © 7895-1936 K[employer]. Care instructions adapted under license by [employer] (which disclaims liability or warranty for this information). If you have questions about a medical condition or this instruction, always ask your healthcare professional. [place] [address] any warranty or liability for your use of this information. DASH Diet: Care Instructions Your Care Instructions The DASH diet is an eating plan that can help lower your blood pressure. DASH stands for Dietary Approaches to Stop Hypertension. Hypertension is high blood pressure. The DASH diet focuses on eating foods that are high in calcium, potassium, and magnesium. These nutrients can lower blood pressure. The foods that are highest in these nutrients are fruits, vegetables, low-fat dairy products, nuts, seeds, and legumes.  But taking calcium, potassium, and magnesium supplements instead of eating foods that are high in those nutrients does not have the same effect. The DASH diet also includes whole grains, fish, and poultry. The DASH diet is one of several lifestyle changes your doctor may recommend to lower your high blood pressure. Your doctor may also want you to decrease the amount of sodium in your diet. Lowering sodium while following the DASH diet can lower blood pressure even further than just the DASH diet alone. Follow-up care is a key part of your treatment and safety. Be sure to make and go to all appointments, and call your doctor if you are having problems. It's also a good idea to know your test results and keep a list of the medicines you take. How can you care for yourself at home? Following the DASH diet · Eat 4 to 5 servings of fruit each day. A serving is 1 medium-sized piece of fruit, ½ cup chopped or canned fruit, 1/4 cup dried fruit, or 4 ounces (½ cup) of fruit juice. Choose fruit more often than fruit juice. · Eat 4 to 5 servings of vegetables each day. A serving is 1 cup of lettuce or raw leafy vegetables, ½ cup of chopped or cooked vegetables, or 4 ounces (½ cup) of vegetable juice. Choose vegetables more often than vegetable juice. · Get 2 to 3 servings of low-fat and fat-free dairy each day. A serving is 8 ounces of milk, 1 cup of yogurt, or 1 ½ ounces of cheese. · Eat 6 to 8 servings of grains each day. A serving is 1 slice of bread, 1 ounce of dry cereal, or ½ cup of cooked rice, pasta, or cooked cereal. Try to choose whole-grain products as much as possible. · Limit lean meat, poultry, and fish to 2 servings each day. A serving is 3 ounces, about the size of a deck of cards. · Eat 4 to 5 servings of nuts, seeds, and legumes (cooked dried beans, lentils, and split peas) each week. A serving is 1/3 cup of nuts, 2 tablespoons of seeds, or ½ cup of cooked beans or peas. · Limit fats and oils to 2 to 3 servings each day. A serving is 1 teaspoon of vegetable oil or 2 tablespoons of salad dressing. · Limit sweets and added sugars to 5 servings or less a week. A serving is 1 tablespoon jelly or jam, ½ cup sorbet, or 1 cup of lemonade. · Eat less than 2,300 milligrams (mg) of sodium a day. If you limit your sodium to 1,500 mg a day, you can lower your blood pressure even more. Tips for success · Start small. Do not try to make dramatic changes to your diet all at once. You might feel that you are missing out on your favorite foods and then be more likely to not follow the plan. Make small changes, and stick with them. Once those changes become habit, add a few more changes. · Try some of the following: ¨ Make it a goal to eat a fruit or vegetable at every meal and at snacks. This will make it easy to get the recommended amount of fruits and vegetables each day. ¨ Try yogurt topped with fruit and nuts for a snack or healthy dessert. ¨ Add lettuce, tomato, cucumber, and onion to sandwiches. ¨ Combine a ready-made pizza crust with low-fat mozzarella cheese and lots of vegetable toppings. Try using tomatoes, squash, spinach, broccoli, carrots, cauliflower, and onions. ¨ Have a variety of cut-up vegetables with a low-fat dip as an appetizer instead of chips and dip. ¨ Sprinkle sunflower seeds or chopped almonds over salads. Or try adding chopped walnuts or almonds to cooked vegetables. ¨ Try some vegetarian meals using beans and peas. Add garbanzo or kidney beans to salads. Make burritos and tacos with mashed parekh beans or black beans. Where can you learn more? Go to http://archana-kaz.info/. Enter H914 in the search box to learn more about \"DASH Diet: Care Instructions. \" Current as of: April 3, 2017 Content Version: 11.3 © 3845-4641 [employer]. Care instructions adapted under license by [employer] (which disclaims liability or warranty for this information).  If you have questions about a medical condition or this instruction, always ask your healthcare professional. [place] [address] any warranty or liability for your use of this information. Home Blood Pressure Test: About This Test 
What is it? A home blood pressure test allows you to keep track of your blood pressure at home. Blood pressure is a measure of the force of blood against the walls of your arteries. Blood pressure readings include two numbers, such as 130/80 (say \"130 over 80\"). The first number is the systolic pressure. The second number is the diastolic pressure. Why is this test done? You may do this test at home to: · Find out if you have high blood pressure. · Track your blood pressure if you have high blood pressure. · Track how well medicine is working to reduce high blood pressure. · Check how lifestyle changes, such as weight loss and exercise, are affecting blood pressure. How can you prepare for the test? 
· Do not use caffeine, tobacco, or medicines known to raise blood pressure (such as nasal decongestant sprays) for at least 30 minutes before taking your blood pressure. · Do not exercise for at least 30 minutes before taking your blood pressure. What happens before the test? 
Take your blood pressure while you feel comfortable and relaxed. Sit quietly with both feet on the floor for at least 5 minutes before the test. 
What happens during the test? 
· Sit with your arm slightly bent and resting on a table so that your upper arm is at the same level as your heart. · Roll up your sleeve or take off your shirt to expose your upper arm. · Wrap the blood pressure cuff around your upper arm so that the lower edge of the cuff is about 1 inch above the bend of your elbow. Proceed with the following steps depending on if you are using an automatic or manual pressure monitor. Automatic blood pressure monitors · Press the on/off button on the automatic monitor and wait until the ready-to-measure \"heart\" symbol appears next to zero in the display window. · Press the start button. The cuff will inflate and deflate by itself. · Your blood pressure numbers will appear on the screen. · Write your numbers in your log book, along with the date and time. Manual blood pressure monitors · Place the earpieces of a stethoscope in your ears, and place the bell of the stethoscope over the artery, just below the cuff. · Close the valve on the rubber inflating bulb. · Squeeze the bulb rapidly with your opposite hand to inflate the cuff until the dial or column of mercury reads about 30 mm Hg higher than your usual systolic pressure. If you do not know your usual pressure, inflate the cuff to 210 mm Hg or until the pulse at your wrist disappears. · Open the pressure valve just slightly by twisting or pressing the valve on the bulb. · As you watch the pressure slowly fall, note the level on the dial at which you first start to hear a pulsing or tapping sound through the stethoscope. This is your systolic blood pressure. · Continue letting the air out slowly. The sounds will become muffled and will finally disappear. Note the pressure when the sounds completely disappear. This is your diastolic blood pressure. Let out all the remaining air. · Write your numbers in your log book, along with the date and time. What else should you know about the test? 
Results for adults ages 25 and older (mm Hg): · Normal (ideal): Systolic 213 or below. Diastolic 79 or below. · Prehypertension: Systolic 753 to 215. Diastolic 80 to 89. · Hypertension: Systolic 603 or above. Diastolic 90 or above. Follow-up care is a key part of your treatment and safety. Be sure to make and go to all appointments, and call your doctor if you are having problems. It's also a good idea to keep a list of the medicines you take. Where can you learn more? Go to http://archana-kaz.info/. Enter C427 in the search box to learn more about \"Home Blood Pressure Test: About This Test.\" Current as of: November 3, 2016 Content Version: 11.3 © 9198-3257 [employer]. Care instructions adapted under license by [employer] (which disclaims liability or warranty for this information). If you have questions about a medical condition or this instruction, always ask your healthcare professional. [address] any warranty or liability for your use of this information. Introducing [facility]! Carolann Corbin introduces Understory patient portal. Now you can access parts of your medical record, email your doctor's office, and request medication refills online. 1. In your internet browser, go to [email or web address] 2. Click on the First Time User? Click Here link in the Sign In box. You will see the New Member Sign Up page. 3. Enter your Understory Access Code exactly as it appears below. You will not need to use this code after youve completed the sign-up process. If you do not sign up before the expiration date, you must request a new code. · Understory Access Code: [email or web address] Expires: 9/11/2017  8:51 AM 
 
4. Enter the last four digits of your Social Security Number (xxxx) and Date of Birth (mm/dd/yyyy) as indicated and click Submit. You will be taken to the next sign-up page. 5. Create a Understory ID. This will be your Understory login ID and cannot be changed, so think of one that is secure and easy to remember. 6. Create a Understory password. You can change your password at any time. 7. Enter your Password Reset Question and Answer. This can be used at a later time if you forget your password. 8. Enter your e-mail address. You will receive e-mail notification when new information is available in [address] 9. Click Sign Up. You can now view and download portions of your medical record. 10. Click the Download Summary menu link to download a portable copy of your medical information. If you have questions, please visit the Frequently Asked Questions section of the [employer] website. Remember, [employer] is NOT to be used for urgent needs. For medical emergencies, dial 911. Now available from your iPhone and Android! Please provide this summary of care documentation to your next provider. Your primary care clinician is listed as Bobbi Negro. If you have any questions after today's visit, please call [phone].

## 2017-07-25 NOTE — PROGRESS NOTES
Subjective:   Renato Damon is a 54 y.o. male presenting for his annual checkup. ROS:  Feeling well. No dyspnea or chest pain on exertion. No abdominal pain, change in bowel habits, black or bloody stools. No urinary tract or prostatic symptoms. No neurological complaints. Specific concerns today:   Since he had his injury, he decided not to proceed with his Achilles repair. Current Outpatient Prescriptions   Medication Sig Dispense Refill    atorvastatin (LIPITOR) 40 mg tablet Take 1 Tab by mouth daily. 80 Tab 3     No Known Allergies  Past Medical History:   Diagnosis Date    Achilles rupture, right 2013    Hypercholesterolemia      Past Surgical History:   Procedure Laterality Date    HX ORTHOPAEDIC  5/2/2013    right achilles repair    HX VASECTOMY  2002    HX WISDOM TEETH EXTRACTION  1985     Family History   Problem Relation Age of Onset    High Cholesterol Sister      Social History   Substance Use Topics    Smoking status: Never Smoker    Smokeless tobacco: Never Used    Alcohol use No      Objective:     Visit Vitals    /90 (BP 1 Location: Left arm, BP Patient Position: Sitting)    Pulse 76    Temp 98.2 °F (36.8 °C) (Oral)    Resp 14    Ht 5' 9.5\" (1.765 m)    Wt 185 lb (83.9 kg)    SpO2 98%    BMI 26.93 kg/m2     The patient appears well, alert, oriented x 3, in no distress. ENT normal.  Neck supple. No thyromegaly. RADHA. Lungs are clear, good air entry, no wheezes, rhonchi or rales. S1 and S2 normal, no murmurs, regular rate and rhythm. Abdomen is soft without tenderness, guarding, mass or organomegaly. Extremities show no edema, normal peripheral pulses. Neurological is normal without focal findings. Gait is slightly favoring the right leg. Lymph:  No LAD. Psych: normal affect. Mood good. Oriented x 3. Judgement and insight intact.      Results for orders placed or performed during the hospital encounter of 07/18/17   CBC W/O DIFF   Result Value Ref Range WBC 5.5 4.6 - 13.2 K/uL    RBC 4.33 (L) 4.70 - 5.50 M/uL    HGB 12.8 (L) 13.0 - 16.0 g/dL    HCT 37.3 36.0 - 48.0 %    MCV 86.1 74.0 - 97.0 FL    MCH 29.6 24.0 - 34.0 PG    MCHC 34.3 31.0 - 37.0 g/dL    RDW 13.2 11.6 - 14.5 %    PLATELET 149 913 - 826 K/uL    MPV 10.2 9.2 - 88.4 FL   METABOLIC PANEL, COMPREHENSIVE   Result Value Ref Range    Sodium 137 136 - 145 mmol/L    Potassium 3.9 3.5 - 5.5 mmol/L    Chloride 102 100 - 108 mmol/L    CO2 29 21 - 32 mmol/L    Anion gap 6 3.0 - 18 mmol/L    Glucose 72 (L) 74 - 99 mg/dL    BUN 11 7.0 - 18 MG/DL    Creatinine 0.89 0.6 - 1.3 MG/DL    BUN/Creatinine ratio 12 12 - 20      GFR est AA >60 >60 ml/min/1.73m2    GFR est non-AA >60 >60 ml/min/1.73m2    Calcium 9.1 8.5 - 10.1 MG/DL    Bilirubin, total 0.7 0.2 - 1.0 MG/DL    ALT (SGPT) 22 16 - 61 U/L    AST (SGOT) 8 (L) 15 - 37 U/L    Alk. phosphatase 76 45 - 117 U/L    Protein, total 7.3 6.4 - 8.2 g/dL    Albumin 4.3 3.4 - 5.0 g/dL    Globulin 3.0 2.0 - 4.0 g/dL    A-G Ratio 1.4 0.8 - 1.7     LIPID PANEL   Result Value Ref Range    LIPID PROFILE          Cholesterol, total 137 <200 MG/DL    Triglyceride 65 <150 MG/DL    HDL Cholesterol 55 40 - 60 MG/DL    LDL, calculated 69 0 - 100 MG/DL    VLDL, calculated 13 MG/DL    CHOL/HDL Ratio 2.5 0 - 5.0     PSA W/ REFLX FREE PSA   Result Value Ref Range    Prostate Specific Ag 0.7 0.0 - 4.0 ng/mL    Reflex Criteria Comment       Assessment/Plan:       ICD-10-CM ICD-9-CM    1. Routine medical exam Z00.00 V70.0    2. Hypercholesterolemia E78.00 272.0 atorvastatin (LIPITOR) 40 mg tablet   3. Achilles rupture, left, subsequent encounter S86.012D V58.89      845.09      Age and sex specific counseling. He had declined surgery. He wants to get a second opinion with his original surgeon, Dr. Paul Sierra. Cont Lipitor. Refills written. Reviewed fasting labs. Will need CRCS at age 50.       All chart history elements were reviewed by me at the time of the visit even though marked at time of note closure. Patient understands our medical plan. Patient has provided input and agrees with goals. Alternatives have been explained and offered. All questions answered. The patient is to call if condition worsens or fails to improve.      Follow-up Disposition:  Return in about 1 year (around 7/25/2018) for physical.

## 2017-07-27 ENCOUNTER — TELEPHONE (OUTPATIENT)
Dept: ORTHOPEDIC SURGERY | Facility: CLINIC | Age: 52
End: 2017-07-27

## 2017-07-27 DIAGNOSIS — M25.572 LEFT ANKLE PAIN, UNSPECIFIED CHRONICITY: Primary | ICD-10-CM

## 2017-07-27 NOTE — TELEPHONE ENCOUNTER
PATIENT CALLED FOR . PATIENT SAID HE DOES NOT WANT TO HAVE SX DONE ON HIS LEFT ACHILLES TENDON , BUT Ambrocio YOUNG TO SET HIM UP FOR PHYSICAL THERAPY WITH [facility] AT THE [facility] LOCATION. PATIENT SAID HIS LFT ACHILLES TENDON IS A LITTLE BETTER BUT NOT COMPLETELY. SO HE WOULD LIKE TO TRY PHYSICAL THERAPY. PATIENT TEL. [phone]. [facility] AT [address] [place] [address]. TEL. [phone]. FAX# [phone].

## 2017-08-01 ENCOUNTER — HOSPITAL ENCOUNTER (OUTPATIENT)
Dept: PHYSICAL THERAPY | Age: 52
Discharge: HOME | End: 2017-08-01
Payer: OTHER GOVERNMENT

## 2017-08-01 PROCEDURE — 97530 THERAPEUTIC ACTIVITIES: CPT

## 2017-08-01 PROCEDURE — 97162 PT EVAL MOD COMPLEX 30 MIN: CPT

## 2017-08-01 PROCEDURE — 97110 THERAPEUTIC EXERCISES: CPT

## 2017-08-01 NOTE — PROGRESS NOTES
SUBJECTIVE  Pain Level (0-10 scale): 2  []constant []intermittent []improving []worsening []no change since onset    Any medication changes, allergies to medications, adverse drug reactions, diagnosis change, or new procedure performed?: [x] No    [] Yes (see summary sheet for update)  Subjective functional status/changes:     PLOF: Running, basketball, gym activities  Limitations to PLOF: unable to complete  Mechanism of Injury: Tameka 3, 2017 - L achilles tendon rupture when running. Current symptoms/Complaints:   Pt reports when he saw the MD, he was told it was a tendon issue and had surgery scheduled, but when it started feeling better, he cx'd surgery. He was referred to PT. He is able to put pressure on his L ankle when walking, but has a slight limp and swelling. Previous Treatment/Compliance: PT for R achilles tendon rupture  PMHx/Surgical Hx: R achilles tendon repair; per Dr. Rodo Walker note, he has HTN. 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 \"  Pt would like to return to the gym/jogging. Barriers: []pain []financial []time []transportation []other  Motivation: Good  Substance use: []Alcohol []Tobacco []other:   FABQ Score: []low []elevate  Cognition: A & O x 3    Other:    OBJECTIVE/EXAMINATION    Imaging:  MRI ANKLE LEFT WO CONT 6/16/17  IMPRESSION: Confirmed Achilles tendon rupture about myotendinous junction level,  with 1.5 cm gap. Additional findings as above.     RADIOGRAPHS:  XR LEFT ANKLE 6/4/17  IMPRESSION:   1. No evidence of acute fracture or dislocation. 2. 5mm osteochondral injury within the medial talar dome of unknown clinical age. Outpatient ankle MRI and orthopedic consultation is recommended for further evaluation.      Physical Therapy Evaluation  - Foot and Ankle    Gait: [] Normal    [] Abnormal    [x] Antalgic    [] NWB Device:  Describe: decreased weight shift from L to R (shortened step length)  Squat: weight shift to R predominantly, dynamic knee valgus    ROM/Strength  [] Unable to assess at this time      AROM        PROM            Strength (1-5)   Right Left Right Left Right Left   Dorsiflexion 12 10   5 4   Plantarflexion 45 55   5 3-   Inversion 20 30   5 5   Eversion 10 10   5 5   Great Toe Ext 75 75   5 5     Flexibility: [] Unable to assess at this time  Gastroc:    (L) Tightness [] WNL   [] Min   [x] Mod   [] Severe    (R) Tightness [] WNL   [] Min   [x] Mod   [] Severe  Soleus:    (L) Tightness [] WNL   [] Min   [x] Mod   [] Severe    (R) Tightness [] WNL   [] Min   [x] Mod   [] Severe  Other:      (L) Tightness [] WNL   [] Min   [] Mod   [] Severe    (R) Tightness [] WNL   [] Min   [] Mod   [] Severe    Palpation:   Location:  Patient's Pain Response: [] Min   [] Mod   [] Severe  Location:  Patient's Pain Response: [] Min   [] Mod   [] Severe    Optional Tests:  Balance/Stork Test: touches/60sec (L): (R):    Single Leg Hop:   (L) Distance(ft):  (R) Distance(ft):  (L)/(R)%:   (L) Time(sec):  (R) Time(sec): (L)/(R)%:      Sub-talor alignment: [] Neutral     [] Pronation      [] Supination    Forefoot alignment:  [] Neutral     [] Varus            [] Valgus    Swelling:   Left (cm) Right (cm)   Figure 8: 56 55     Anterior Drawer: [x] Neg    [] Pos  Posterior Drawer:  [] Neg    [] Pos  Inversion Stress:  [] Neg    [] Pos  Talar Tilt:   [x] Neg    [] Pos  Eversion Stress:  [] Neg    [] Pos  Amarilis's Sign:  [x] Neg    [] Pos  Garcia Test: [] Neg    [x] Pos    Other tests/ comments:

## 2017-08-01 NOTE — PROGRESS NOTES
[facility]  [address]  [place], [address]  [phone] fax    Plan of Care/ Statement of Necessity for Physical Therapy Services    Patient name: Mery Van Start of Care: 2017   Referral source: Jessi Weinstein MD : 1965    Medical Diagnosis: Pain in left ankle and joints of left foot [M25.572]   Onset Date:2017    Treatment Diagnosis:  L achilles tendon rupture   Prior Hospitalization: see medical history Provider#: [id number]   Medications: Verified on Patient summary List    Comorbidities: R achilles tendon repair; per Dr. Dayron Guzman note, he has HTN. Prior Level of Function: Able to run/play basketball without difficulty     The Plan of Care and following information is based on the information from the initial evaluation. Assessment/ key information: Pt presents with known L achilles tendon rupture, with limitations noted in gait, squat mechanics, weight bearing heel raise, edema about L ankle, moderate restrictions in B gastroc-soleus complex, and FOTO score of 60. Pt would benefit from skilled PT intervention to address the above listed limitations and allow improved functional task completion. Pt will be limited in overall progress due to known L achilles tendon rupture.     Evaluation Complexity History LOW Complexity : Zero comorbidities / personal factors that will impact the outcome / POC; Examination LOW Complexity : 1-2 Standardized tests and measures addressing body structure, function, activity limitation and / or participation in recreation  ;Presentation LOW Complexity : Stable, uncomplicated  ;Clinical Decision Making MEDIUM Complexity : FOTO score of 26-74  Overall Complexity Rating: MEDIUM  Problem List: pain affecting function, decrease ROM, decrease strength, edema affecting function, impaired gait/ balance, decrease ADL/ functional abilitiies, decrease activity tolerance, decrease flexibility/ joint mobility and decrease transfer abilities   Treatment Plan may include any combination of the following: Therapeutic exercise, Therapeutic activities, Neuromuscular re-education, Physical agent/modality, Gait/balance training, Manual therapy, Patient education, Self Care training, Functional mobility training, Home safety training and Stair training  Patient / Family readiness to learn indicated by: asking questions, trying to perform skills and interest  Persons(s) to be included in education: patient (P)  Barriers to Learning/Limitations: yes; Physical - known L achilles tendon rupture  Patient Goal (s): \"Pain/discomfort goes away, normal walking gait restored. \"  Pt would like to return to the gym/jogging. Patient Self Reported Health Status: excellent  Rehabilitation Potential: fair secondary to known L achilles tendon rupture  Short Term Goals: To be accomplished in one treatments:   1. Pt will demonstrate compliance with HEP for self management of symptoms. Long Term Goals: To be accomplished in two weeks:   1. Pt will demonstrate FOTO to 76 or greater to indicate improved functional task completion. 2.  Pt will demonstrate L ankle PF in weight bearing to 4/5 to improve functional walking. 3.  Pt will demonstrate L ankle figure-8 edema measurement to 55 cm or less to indicate decreased swelling for improved ankle function. Frequency / Duration: Patient to be seen 2 times per week for 2 weeks. Patient/ Caregiver education and instruction: Diagnosis, prognosis, self care, activity modification and exercises   [x]  Plan of care has been reviewed with TU Stevens, PT 8/1/2017 4:18 PM    ________________________________________________________________________    I certify that the above Therapy Services are being furnished while the patient is under my care. I agree with the treatment plan and certify that this therapy is necessary.     Physician's Signature:____________________ Date:____________Time: _________    Please sign and return to [facility] [address] [place] [address] & [facility]  [address] [facility] [address]  [place], [facility] [address]  [phone] fax

## 2017-08-01 NOTE — PROGRESS NOTES
PT DAILY TREATMENT NOTE - [facility] [address]    Patient Name: Ranjana Segura  Date:2017  : 1965  [x]  Patient  Verified  Payor:  / Plan: [facility]  RETIREES AND DEPENDENTS / Product Type: [address] [place] [address] /    In [facility] time:  Total Treatment Time (min): 32  1:1 Treatment Time ( only): 32   Visit #: 1 of 4    Treatment Area: Pain in left ankle and joints of left foot [M25.572]    SUBJECTIVE  Pain Level (0-10 scale): 2  Any medication changes, allergies to medications, adverse drug reactions, diagnosis change, or new procedure performed?: [x] No    [] Yes (see summary sheet for update)  Subjective functional status/changes:   [] No changes reported  Mechanism of Injury: Tameka 3, 2017 - L achilles tendon rupture when running. Current symptoms/Complaints:   Pt reports when he saw the MD, he was told it was a tendon issue and had surgery scheduled, but when it started feeling better, he cx'd surgery. He was referred to PT. He is able to put pressure on his L ankle when walking, but has a slight limp and swelling. Previous Treatment/Compliance: PT for R achilles tendon rupture    OBJECTIVE    16 min [x]Eval                  []Re-Eval     8 min Therapeutic Exercise:  [x] See flow sheet :   Rationale: increase strength to improve the patients ability to perform ADLs    8 min Therapeutic Activity:  [x]  See flow sheet :   Rationale:  Reviewed with pt involved anatomy and pathology, treatment plan, and goals.           With   [] TE   [] TA   [] neuro   [] other: Patient Education: [x] Review HEP    [] Progressed/Changed HEP based on:   [] positioning   [] body mechanics   [] transfers   [] heat/ice application    [] other:      Other Objective/Functional Measures: see eval.     Pain Level (0-10 scale) post treatment: 2    ASSESSMENT/Changes in Function:   Pt presents with known L achilles tendon rupture, with limitations noted in gait, squat mechanics, weight bearing heel raise, edema about L ankle, moderate restrictions in B gastroc-soleus complex, and FOTO score of 60. Pt would benefit from skilled PT intervention to address the above listed limitations and allow improved functional task completion. Pt will be limited in overall progress due to known L achilles tendon rupture. Patient will continue to benefit from skilled PT services to modify and progress therapeutic interventions, address functional mobility deficits, address ROM deficits, address strength deficits, analyze and address soft tissue restrictions, analyze and cue movement patterns, analyze and modify body mechanics/ergonomics, assess and modify postural abnormalities and instruct in home and community integration to attain remaining goals. [x]  See Plan of Care  []  See progress note/recertification  []  See Discharge Summary         Progress towards goals / Updated goals:  Short Term Goals: To be accomplished in one treatments:                         1.  Pt will demonstrate compliance with HEP for self management of symptoms. Long Term Goals: To be accomplished in two weeks:                         1.  Pt will demonstrate FOTO to 76 or greater to indicate improved functional task completion. 2.  Pt will demonstrate L ankle PF in weight bearing to 4/5 to improve functional walking. 3.  Pt will demonstrate L ankle figure-8 edema measurement to 55 cm or less to indicate decreased swelling for improved ankle function.     PLAN  [x]  Upgrade activities as tolerated     [x]  Continue plan of care  [x]  Update interventions per flow sheet       []  Discharge due to:_  []  Other:_      Caitlin Bautista, PT 8/1/2017  4:25 PM    Future Appointments  Date Time Provider Madan Mendez   8/4/2017 4:30 PM Fernando Rivera PT [facility]   8/9/2017 4:00 PM Fernando Rivera, PT [facility]   8/11/2017 3:30 PM Fernando Rivera, PT [facility]   7/25/2018 2:30 PM [place] [address]

## 2017-08-04 ENCOUNTER — HOSPITAL ENCOUNTER (OUTPATIENT)
Dept: PHYSICAL THERAPY | Age: 52
Discharge: HOME | End: 2017-08-04
Payer: OTHER GOVERNMENT

## 2017-08-04 PROCEDURE — 97110 THERAPEUTIC EXERCISES: CPT

## 2017-08-04 PROCEDURE — 97112 NEUROMUSCULAR REEDUCATION: CPT

## 2017-08-04 PROCEDURE — 97140 MANUAL THERAPY 1/> REGIONS: CPT

## 2017-08-04 NOTE — PROGRESS NOTES
PT DAILY TREATMENT NOTE     Patient Name: Arminda Valencia  Date:2017  : 1965  [x]  Patient  Verified  Payor:  / Plan: [facility]  RETIREES AND DEPENDENTS / Product Type: Clayton Lamer /    In time:4:30  Out time:5:08  Total Treatment Time (min): 38  Visit #: 2 of 4    Treatment Area: Pain in left ankle and joints of left foot [M25.572]    SUBJECTIVE  Pain Level (0-10 scale): 0  Any medication changes, allergies to medications, adverse drug reactions, diagnosis change, or new procedure performed?: [x] No    [] Yes (see summary sheet for update)  Subjective functional status/changes:   [] No changes reported  \"Doing good so far\"    OBJECTIVE    22 min Therapeutic Exercise:  [] See flow sheet :   Rationale: increase ROM and increase strength to improve the patients ability to perform work duties and household activities with improved ease and efficiency    8 min Neuromuscular Re-education:  []  See flow sheet :   Rationale: improve coordination, improve balance and increase proprioception  to improve the patients ability to improve ankle stability and intrinsic strength with daily tasks    8 min Manual Therapy:  Retrograde massage R ankle   Rationale: decrease edema  to improve ankle mechanics with ADLs              With   [] TE   [] TA   [] neuro   [] other: Patient Education: [x] Review HEP    [] Progressed/Changed HEP based on:   [] positioning   [] body mechanics   [] transfers   [] heat/ice application    [] other:      Other Objective/Functional Measures:      Pain Level (0-10 scale) post treatment: 0    ASSESSMENT/Changes in Function: Pt tolerates first f/u well with no reports of increased pain. He reports some increased \"pressure\" with increased L WB'ing for HR. Continue to progress stability and strength as tolerated.     Patient will continue to benefit from skilled PT services to modify and progress therapeutic interventions, address functional mobility deficits, address strength deficits, analyze and address soft tissue restrictions, analyze and cue movement patterns, analyze and modify body mechanics/ergonomics, assess and modify postural abnormalities and address imbalance/dizziness to attain remaining goals. []  See Plan of Care  []  See progress note/recertification  []  See Discharge Summary         Progress towards goals / Updated goals:  Short Term Goals: To be accomplished in one treatments:                         0.  Pt will demonstrate compliance with HEP for self management of symptoms. Pt reports good tolerance to HEP 8/4/17  Long Term Goals: To be accomplished in two weeks:                         1.  Pt will demonstrate FOTO to 76 or greater to indicate improved functional task completion.                         2.  Pt will demonstrate L ankle PF in weight bearing to 4/5 to improve functional walking.                         3.  Pt will demonstrate L ankle figure-8 edema measurement to 55 cm or less to indicate decreased swelling for improved ankle function.     PLAN  []  Upgrade activities as tolerated     [x]  Continue plan of care  []  Update interventions per flow sheet       []  Discharge due to:_  []  Other:_      Christiano Casey DPT 8/4/2017  4:24 PM    Future Appointments  Date Time Provider Madan Mendez   8/4/2017 4:30 PM [id number] [facility]   8/9/2017 4:00 PM Megan Harris, PT [facility]   8/11/2017 3:30 PM Megan Harris, PT [facility]   7/25/2018 2:30 PM Poppy Francis MD [place] [address]

## 2017-08-09 ENCOUNTER — HOSPITAL ENCOUNTER (OUTPATIENT)
Dept: PHYSICAL THERAPY | Age: 52
Discharge: HOME | End: 2017-08-09
Payer: OTHER GOVERNMENT

## 2017-08-09 PROCEDURE — 97110 THERAPEUTIC EXERCISES: CPT

## 2017-08-09 PROCEDURE — 97140 MANUAL THERAPY 1/> REGIONS: CPT

## 2017-08-09 PROCEDURE — 97112 NEUROMUSCULAR REEDUCATION: CPT

## 2017-08-09 NOTE — PROGRESS NOTES
PT DAILY TREATMENT NOTE     Patient Name: Rosalina Ray  Date:2017  : 1965  [x]  Patient  Verified  Payor:  / Plan: [facility]  RETIREES AND DEPENDENTS / Product Type: Merry Leak /    In time:4:00  Out time: 4:45  Total Treatment Time (min): 45  Visit #: 3 of 4    Treatment Area: Pain in left ankle and joints of left foot [M25.572]    SUBJECTIVE  Pain Level (0-10 scale): 0  Any medication changes, allergies to medications, adverse drug reactions, diagnosis change, or new procedure performed?: [x] No    [] Yes (see summary sheet for update)  Subjective functional status/changes:   [] No changes reported  Pt reports he feels PT is helping    OBJECTIVE    29 min Therapeutic Exercise:  [x] See flow sheet :   Rationale: increase ROM and increase strength to improve the patients ability to perform work duties and household activities with improved ease and efficiency    8 min Neuromuscular Re-education:  [x]  See flow sheet :   Rationale: improve coordination, improve balance and increase proprioception  to improve the patients ability to improve ankle stability and intrinsic strength with daily tasks    8 min Manual Therapy:  Retrograde massage R ankle, STM R gastroc   Rationale: decrease edema  to improve ankle mechanics with ADLs              With   [] TE   [] TA   [] neuro   [] other: Patient Education: [x] Review HEP    [] Progressed/Changed HEP based on:   [] positioning   [] body mechanics   [] transfers   [] heat/ice application    [] other:      Other Objective/Functional Measures: no changes to therex     Pain Level (0-10 scale) post treatment: 0    ASSESSMENT/Changes in Function: pt tolerated tx well, edema appears to be improving. Gave pt updated Tband for home use  (green).     Patient will continue to benefit from skilled PT services to modify and progress therapeutic interventions, address functional mobility deficits, address strength deficits, analyze and address soft tissue restrictions, analyze and cue movement patterns, analyze and modify body mechanics/ergonomics, assess and modify postural abnormalities and address imbalance/dizziness to attain remaining goals. []  See Plan of Care  []  See progress note/recertification  []  See Discharge Summary         Progress towards goals / Updated goals:  Short Term Goals: To be accomplished in one treatments:                         6.  Pt will demonstrate compliance with HEP for self management of symptoms. Pt reports good tolerance to HEP 8/4/17  Long Term Goals: To be accomplished in two weeks:                         1.  Pt will demonstrate FOTO to 76 or greater to indicate improved functional task completion.                         2.  Pt will demonstrate L ankle PF in weight bearing to 4/5 to improve functional walking.                         3.  Pt will demonstrate L ankle figure-8 edema measurement to 55 cm or less to indicate decreased swelling for improved ankle function.     PLAN  []  Upgrade activities as tolerated     [x]  Continue plan of care  []  Update interventions per flow sheet       []  Discharge due to:_  []  Other:_      Myrna Alonso PT  8/9/2017  4:24 PM    Future Appointments  Date Time Provider Madan Mendez   8/11/2017 3:30 PM Myrna Alonso PT [id number]   7/25/2018 2:30 PM Winnie Toro MD [place] [address]

## 2017-08-11 ENCOUNTER — APPOINTMENT (OUTPATIENT)
Dept: PHYSICAL THERAPY | Age: 52
End: 2017-08-11
Payer: OTHER GOVERNMENT

## 2017-08-15 ENCOUNTER — HOSPITAL ENCOUNTER (OUTPATIENT)
Dept: PHYSICAL THERAPY | Age: 52
Discharge: HOME | End: 2017-08-15
Payer: OTHER GOVERNMENT

## 2017-08-15 PROCEDURE — 97110 THERAPEUTIC EXERCISES: CPT

## 2017-08-15 PROCEDURE — 97112 NEUROMUSCULAR REEDUCATION: CPT

## 2017-08-15 NOTE — PROGRESS NOTES
PT DAILY TREATMENT NOTE     Patient Name: Eddie Hernandez  Date:8/15/2017  : 1965  [x]  Patient  Verified  Payor:  / Plan: [facility]  RETIREES AND DEPENDENTS / Product Type: Vevelyn Pressman /    In time:4:30  Out time:5:12  Total Treatment Time (min): 42  1:1 Treatment Time: 30  Visit #: 4 of 4    Treatment Area: Pain in left ankle and joints of left foot [M25.572]    SUBJECTIVE  Pain Level (0-10 scale): 0  Any medication changes, allergies to medications, adverse drug reactions, diagnosis change, or new procedure performed?: [x] No    [] Yes (see summary sheet for update)  Subjective functional status/changes:   [] No changes reported  Pt reports that he still has difficulty with increased [address] on L LE. Feels he has improved though    OBJECTIVE    34 min Therapeutic Exercise:  [] See flow sheet :   Rationale: increase ROM and increase strength to improve the patients ability to perform daily tasks and ADLs with increased ease     8 min Neuromuscular Re-education:  []  See flow sheet :   Rationale: improve balance and increase proprioception  to improve the patients ability to improve stability with standing activities              With   [] TE   [] TA   [] neuro   [] other: Patient Education: [x] Review HEP    [] Progressed/Changed HEP based on:   [] positioning   [] body mechanics   [] transfers   [] heat/ice application    [] other:      Other Objective/Functional Measures: 12 SL heel raises but UE assist required     Figure-8 measurement L ankle 55cm    Pain Level (0-10 scale) post treatment: 0    ASSESSMENT/Changes in Function: Pt shows some progression towards LTG thus far but still limited with PF strength on L. His swelling has reduced thus far as well. Pt feels that he is still limited with WB'ing/strength on L LE and states he would like to f/u with MD to discuss progress as well. Pt elects to D/C with updated HEP progressing to SL strengthening to assess further progression. Patient will continue to benefit from skilled PT services to modify and progress therapeutic interventions, address functional mobility deficits, address ROM deficits, address strength deficits, analyze and address soft tissue restrictions, analyze and cue movement patterns, analyze and modify body mechanics/ergonomics and address imbalance/dizziness to attain remaining goals. []  See Plan of Care  []  See progress note/recertification  [x]  See Discharge Summary         Progress towards goals / Updated goals:  Short Term Goals: To be accomplished in one treatments:                         2.  Pt will demonstrate compliance with HEP for self management of symptoms. Pt reports good tolerance to HEP 8/4/17  Long Term Goals: To be accomplished in two weeks:                         1.  Pt will demonstrate FOTO to 76 or greater to indicate improved functional task completion. Increased 1 point to 61 8/15/17                         2.  Pt will demonstrate L ankle PF in weight bearing to 4/5 to improve functional walking. Progressed, pt able to perform 12 repetitions with UE support 8/15/17                         3. Swift Sprawls will demonstrate L ankle figure-8 edema measurement to 55 cm or less to indicate decreased swelling for improved ankle function.  Met- 55 cm today 8/15/17    PLAN  []  Upgrade activities as tolerated     []  Continue plan of care  []  Update interventions per flow sheet       [x]  Discharge due to: limited functional progression  []  Other:_      Ruma Blanco DPT 8/15/2017  4:44 PM    Future Appointments  Date Time Provider Madan Mendez   7/25/2018 2:30 PM MD Liya Rivera

## 2017-08-16 NOTE — PROGRESS NOTES
[facility]  [address]  [place], [address]  [phone] fax    Physical Therapy Discharge Summary  Patient name: Silvana Solorzano Start of Care: 2017   Referral source: Joan Dominguez MD : 1965                          Medical Diagnosis: Pain in left ankle and joints of left foot [M25.572] Onset Date:2017                          Treatment Diagnosis:  L achilles tendon rupture   Prior Hospitalization: see medical history Provider#: [id number]   Medications: Verified on Patient summary List    Comorbidities: R achilles tendon repair; per Dr. Stephanie Abad note, he has HTN. Prior Level of Function: Able to run/play basketball without difficulty  Visits from Start of Care: 4    Missed Visits: 0  Reporting Period : 2017 to 8/15/2017      Summary of Care:  [address] be accomplished in one treatments:                         8.  Pt will demonstrate compliance with HEP for self management of symptoms. Pt reports good tolerance to HEP 17  Long Term Goals: To be accomplished in two weeks:                         1.  Pt will demonstrate FOTO to 76 or greater to indicate improved functional task completion. Increased 1 point to 61 8/15/17                         2.  Pt will demonstrate L ankle PF in weight bearing to 4/5 to improve functional walking. Progressed, pt able to perform 12 repetitions with UE support 8/15/17                         3. Vasyl Crumb will demonstrate L ankle figure-8 edema measurement to 55 cm or less to indicate decreased swelling for improved ankle function. Met- 55 cm today 8/15/17        ASSESSMENT/RECOMMENDATIONS: Pt feels he has made some progress in regards to flexibility at this time.  PF strength still limited in [address] at this time, requiring UE assist. Pt given option of brief continuance of PT assessing response to unilateral strengthening or D/C to self maintenance program to assess further progress over time.  Pt reports he would like to try self maintenance program and f/u with MD.     [x]Discontinue therapy: []Patient has reached or is progressing toward set goals      []Patient is non-compliant or has abdicated      [x]Due to lack of appreciable progress towards set goals    Jazzmine Frazier, DPT 8/16/2017 9:12 AM

## 2017-09-15 ENCOUNTER — DOCUMENTATION ONLY (OUTPATIENT)
Dept: ORTHOPEDIC SURGERY | Age: 52
End: 2017-09-15

## 2018-07-25 ENCOUNTER — OFFICE VISIT (OUTPATIENT)
Dept: FAMILY MEDICINE CLINIC | Age: 53
End: 2018-07-25

## 2018-07-25 VITALS
HEIGHT: 70 IN | TEMPERATURE: 98 F | BODY MASS INDEX: 26.92 KG/M2 | DIASTOLIC BLOOD PRESSURE: 76 MMHG | WEIGHT: 188 LBS | RESPIRATION RATE: 14 BRPM | OXYGEN SATURATION: 98 % | SYSTOLIC BLOOD PRESSURE: 118 MMHG | HEART RATE: 74 BPM

## 2018-07-25 DIAGNOSIS — Z12.5 PROSTATE CANCER SCREENING: ICD-10-CM

## 2018-07-25 DIAGNOSIS — B07.9 WART OF FACE: ICD-10-CM

## 2018-07-25 DIAGNOSIS — E78.00 HYPERCHOLESTEROLEMIA: ICD-10-CM

## 2018-07-25 DIAGNOSIS — Z12.11 COLON CANCER SCREENING: ICD-10-CM

## 2018-07-25 DIAGNOSIS — Z00.00 ROUTINE MEDICAL EXAM: Primary | ICD-10-CM

## 2018-07-25 DIAGNOSIS — S86.012S ACHILLES RUPTURE, LEFT, SEQUELA: ICD-10-CM

## 2018-07-25 RX ORDER — ATORVASTATIN CALCIUM 40 MG/1
40 TABLET, FILM COATED ORAL DAILY
Qty: 90 TAB | Refills: 3 | Status: SHIPPED | OUTPATIENT
Start: 2018-07-25 | End: 2019-08-21 | Stop reason: SDUPTHER

## 2018-07-25 NOTE — MR AVS SNAPSHOT
[facility] [address] [facility] 
[phone] Patient: Brdiger Wright MRN: [id number] :1965 Visit Information Date & Time Provider Department Dept. Phone Encounter #  
 2018  2:30 PM Aj Winkler [facility] [phone] [id number] Follow-up Instructions Return in about 1 year (around 2019) for physical.  Fasting labs due at your earliest convenience. Upcoming Health Maintenance Date Due FOBT Q 1 YEAR AGE 50-75 12/15/2017 Influenza Age 5 to Adult 2018 DTaP/Tdap/Td series (2 - Td) 2021 Allergies as of 2018  Review Complete On: 2018 By: Aj Winkler MD  
 No Known Allergies Current Immunizations  Reviewed on 2017 Name Date TDAP Vaccine 2011 Not reviewed this visit You Were Diagnosed With   
  
 Codes Comments Routine medical exam    -  Primary ICD-10-CM: Z00.00 ICD-9-CM: V70.0 Hypercholesterolemia     ICD-10-CM: E78.00 ICD-9-CM: 272.0 Body mass index (BMI) of 25.0 to 29.9     ICD-10-CM: E66.3 ICD-9-CM: 278.02 Prostate cancer screening     ICD-10-CM: Z12.5 ICD-9-CM: V76.44 Colon cancer screening     ICD-10-CM: Z12.11 ICD-9-CM: V76.51 Wart of face     ICD-10-CM: B07.9 ICD-9-CM: 078.10 Vitals BP Pulse Temp Resp Height(growth percentile) Weight(growth percentile) 118/76 (BP 1 Location: Left arm, BP Patient Position: Sitting) 74 98 °F (36.7 °C) (Oral) 14 5' 9.5\" (1.765 m) 188 lb (85.3 kg) SpO2 BMI Smoking Status 98% 27.36 kg/m2 Never Smoker Vitals History BMI and BSA Data Body Mass Index Body Surface Area  
 27.36 kg/m 2 2.05 m 2 Your Updated Medication List  
  
   
This list is accurate as of 18  2:35 PM.  Always use your most recent med list.  
  
  
  
  
 atorvastatin 40 mg tablet Commonly known as:  LIPITOR Take 1 Tab by mouth daily. Prescriptions Printed Refills  
 atorvastatin (LIPITOR) 40 mg tablet 3 Sig: Take 1 Tab by mouth daily. Class: Print Route: Oral  
  
We Performed the Following REFERRAL TO GASTROENTEROLOGY [JGM51 Custom] Comments:  
 Please evaluate patient for CRCS Follow-up Instructions Return in about 1 year (around 7/25/2019) for physical.  Fasting labs due at your earliest convenience. To-Do List   
 07/25/2018 Lab:  CBC W/O DIFF   
  
 07/25/2018 Lab:  LIPID PANEL   
  
 07/25/2018 Lab:  METABOLIC PANEL, COMPREHENSIVE   
  
 07/25/2018 Lab:  PSA SCREENING (SCREENING) Referral Information Referral ID Referred By Referred To  
  
 [id number] Abigail Menon MD   
   [address] [address] Phone: [phone] Fax: [phone] Visits Status Start Date End Date 1 New Request 7/25/18 7/25/19 If your referral has a status of pending review or denied, additional information will be sent to support the outcome of this decision. Patient Instructions Well Visit, Men 48 to 72: Care Instructions Your Care Instructions Physical exams can help you stay healthy. Your doctor has checked your overall health and may have suggested ways to take good care of yourself. He or she also may have recommended tests. At home, you can help prevent illness with healthy eating, regular exercise, and other steps. Follow-up care is a key part of your treatment and safety. Be sure to make and go to all appointments, and call your doctor if you are having problems. It's also a good idea to know your test results and keep a list of the medicines you take. How can you care for yourself at home? · Reach and stay at a healthy weight. This will lower your risk for many problems, such as obesity, diabetes, heart disease, and high blood pressure. · Get at least 30 minutes of exercise on most days of the week. Walking is a good choice. You also may want to do other activities, such as running, swimming, cycling, or playing tennis or team sports. · Do not smoke. Smoking can make health problems worse. If you need help quitting, talk to your doctor about stop-smoking programs and medicines. These can increase your chances of quitting for good. · Protect your skin from too much sun. When you're outdoors from 10 a.m. to 4 p.m., stay in the shade or cover up with clothing and a hat with a wide brim. Wear sunglasses that block UV rays. Even when it's cloudy, put broad-spectrum sunscreen (SPF 30 or higher) on any exposed skin. · See a dentist one or two times a year for checkups and to have your teeth cleaned. · Wear a seat belt in the car. · Limit alcohol to 2 drinks a day. Too much alcohol can cause health problems. Follow your doctor's advice about when to have certain tests. These tests can spot problems early. · Cholesterol. Your doctor will tell you how often to have this done based on your overall health and other things that can increase your risk for heart attack and stroke. · Blood pressure. Have your blood pressure checked during a routine doctor visit. Your doctor will tell you how often to check your blood pressure based on your age, your blood pressure results, and other factors. · Prostate exam. Talk to your doctor about whether you should have a blood test (called a PSA test) for prostate cancer. Experts disagree on whether men should have this test. Some experts recommend that you discuss the benefits and risks of the test with your doctor. · Diabetes. Ask your doctor whether you should have tests for diabetes. · Vision. Some experts recommend that you have yearly exams for glaucoma and other age-related eye problems starting at age 48. · Hearing. Tell your doctor if you notice any change in your hearing.  You can have tests to find out how well you hear. · Colon cancer. You should begin tests for colon cancer at age 48. You may have one of several tests. Your doctor will tell you how often to have tests based on your age and risk. Risks include whether you already had a precancerous polyp removed from your colon or whether your parent, brother, sister, or child has had colon cancer. · Heart attack and stroke risk. At least every 4 to 6 years, you should have your risk for heart attack and stroke assessed. Your doctor uses factors such as your age, blood pressure, cholesterol, and whether you smoke or have diabetes to show what your risk for a heart attack or stroke is over the next 10 years. · Abdominal aortic aneurysm. Ask your doctor whether you should have a test to check for an aneurysm. You may need a test if you ever smoked or if your parent, brother, sister, or child has had an aneurysm. When should you call for help? Watch closely for changes in your health, and be sure to contact your doctor if you have any problems or symptoms that concern you. Where can you learn more? Go to http://archana-kaz.info/. Enter T293 in the search box to learn more about \"Well Visit, Men 48 to 72: Care Instructions. \" Current as of: Tameka 10, 2017 Content Version: 11.7 © 1072-9271 [employer]. Care instructions adapted under license by [employer] (which disclaims liability or warranty for this information). If you have questions about a medical condition or this instruction, always ask your healthcare professional. [place] [address] any warranty or liability for your use of this information. Well Visit, Men 48 to 72: Care Instructions Your Care Instructions Physical exams can help you stay healthy. Your doctor has checked your overall health and may have suggested ways to take good care of yourself. He or she also may have recommended tests. At home, you can help prevent illness with healthy eating, regular exercise, and other steps. Follow-up care is a key part of your treatment and safety. Be sure to make and go to all appointments, and call your doctor if you are having problems. It's also a good idea to know your test results and keep a list of the medicines you take. How can you care for yourself at home? · Reach and stay at a healthy weight. This will lower your risk for many problems, such as obesity, diabetes, heart disease, and high blood pressure. · Get at least 30 minutes of exercise on most days of the week. Walking is a good choice. You also may want to do other activities, such as running, swimming, cycling, or playing tennis or team sports. · Do not smoke. Smoking can make health problems worse. If you need help quitting, talk to your doctor about stop-smoking programs and medicines. These can increase your chances of quitting for good. · Protect your skin from too much sun. When you're outdoors from 10 a.m. to 4 p.m., stay in the shade or cover up with clothing and a hat with a wide brim. Wear sunglasses that block UV rays. Even when it's cloudy, put broad-spectrum sunscreen (SPF 30 or higher) on any exposed skin. · See a dentist one or two times a year for checkups and to have your teeth cleaned. · Wear a seat belt in the car. · Limit alcohol to 2 drinks a day. Too much alcohol can cause health problems. Follow your doctor's advice about when to have certain tests. These tests can spot problems early. · Cholesterol. Your doctor will tell you how often to have this done based on your overall health and other things that can increase your risk for heart attack and stroke. · Blood pressure. Have your blood pressure checked during a routine doctor visit. Your doctor will tell you how often to check your blood pressure based on your age, your blood pressure results, and other factors. · Prostate exam. Talk to your doctor about whether you should have a blood test (called a PSA test) for prostate cancer. Experts disagree on whether men should have this test. Some experts recommend that you discuss the benefits and risks of the test with your doctor. · Diabetes. Ask your doctor whether you should have tests for diabetes. · Vision. Some experts recommend that you have yearly exams for glaucoma and other age-related eye problems starting at age 48. · Hearing. Tell your doctor if you notice any change in your hearing. You can have tests to find out how well you hear. · Colon cancer. You should begin tests for colon cancer at age 48. You may have one of several tests. Your doctor will tell you how often to have tests based on your age and risk. Risks include whether you already had a precancerous polyp removed from your colon or whether your parent, brother, sister, or child has had colon cancer. · Heart attack and stroke risk. At least every 4 to 6 years, you should have your risk for heart attack and stroke assessed. Your doctor uses factors such as your age, blood pressure, cholesterol, and whether you smoke or have diabetes to show what your risk for a heart attack or stroke is over the next 10 years. · Abdominal aortic aneurysm. Ask your doctor whether you should have a test to check for an aneurysm. You may need a test if you ever smoked or if your parent, brother, sister, or child has had an aneurysm. When should you call for help? Watch closely for changes in your health, and be sure to contact your doctor if you have any problems or symptoms that concern you. Where can you learn more? Go to http://archana-kaz.info/. Enter R710 in the search box to learn more about \"Well Visit, Men 48 to 72: Care Instructions. \" Current as of: Tameka 10, 2017 Content Version: 11.7 © 1251-2526 [employer].  Care instructions adapted under license by [address] (which disclaims liability or warranty for this information). If you have questions about a medical condition or this instruction, always ask your healthcare professional. [address] any warranty or liability for your use of this information. A Healthy Lifestyle: Care Instructions Your Care Instructions A healthy lifestyle can help you feel good, stay at a healthy weight, and have plenty of energy for both work and play. A healthy lifestyle is something you can share with your whole family. A healthy lifestyle also can lower your risk for serious health problems, such as high blood pressure, heart disease, and diabetes. You can follow a few steps listed below to improve your health and the health of your family. Follow-up care is a key part of your treatment and safety. Be sure to make and go to all appointments, and call your doctor if you are having problems. It's also a good idea to know your test results and keep a list of the medicines you take. How can you care for yourself at home? · Do not eat too much sugar, fat, or fast foods. You can still have dessert and treats now and then. The goal is moderation. · Start small to improve your eating habits. Pay attention to portion sizes, drink less juice and soda pop, and eat more fruits and vegetables. ¨ Eat a healthy amount of food. A 3-ounce serving of meat, for example, is about the size of a deck of cards. Fill the rest of your plate with vegetables and whole grains. ¨ Limit the amount of soda and sports drinks you have every day. Drink more water when you are thirsty. ¨ Eat at least 5 servings of fruits and vegetables every day. It may seem like a lot, but it is not hard to reach this goal. A serving or helping is 1 piece of fruit, 1 cup of vegetables, or 2 cups of leafy, raw vegetables.  Have an apple or some carrot sticks as an afternoon snack instead of a candy bar. Try to have fruits and/or vegetables at every meal. 
· Make exercise part of your daily routine. You may want to start with simple activities, such as walking, bicycling, or slow swimming. Try to be active 30 to 60 minutes every day. You do not need to do all 30 to 60 minutes all at once. For example, you can exercise 3 times a day for 10 or 20 minutes. Moderate exercise is safe for most people, but it is always a good idea to talk to your doctor before starting an exercise program. 
· Keep moving. Jb Arellano the lawn, work in the garden, or [employer]. Take the stairs instead of the elevator at work. · If you smoke, quit. People who smoke have an increased risk for heart attack, stroke, cancer, and other lung illnesses. Quitting is hard, but there are ways to boost your chance of quitting tobacco for good. ¨ Use nicotine gum, patches, or lozenges. ¨ Ask your doctor about stop-smoking programs and medicines. ¨ Keep trying. In addition to reducing your risk of diseases in the future, you will notice some benefits soon after you stop using tobacco. If you have shortness of breath or asthma symptoms, they will likely get better within a few weeks after you quit. · Limit how much alcohol you drink. Moderate amounts of alcohol (up to 2 drinks a day for men, 1 drink a day for women) are okay. But drinking too much can lead to liver problems, high blood pressure, and other health problems. Family health If you have a family, there are many things you can do together to improve your health. · Eat meals together as a family as often as possible. · Eat healthy foods. This includes fruits, vegetables, lean meats and dairy, and whole grains. · Include your family in your fitness plan. Most people think of activities such as jogging or tennis as the way to fitness, but there are many ways you and your family can be more active.  Anything that makes you breathe hard and gets your heart pumping is exercise. Here are some tips: 
¨ Walk to do errands or to take your child to school or the bus. ¨ Go for a family bike ride after dinner instead of watching TV. Where can you learn more? Go to http://archana-kaz.info/. Enter C768 in the search box to learn more about \"A Healthy Lifestyle: Care Instructions. \" Current as of: December 7, 2017 Content Version: 11.7 © 4475-3086 [employer]. Care instructions adapted under license by [employer] (which disclaims liability or warranty for this information). If you have questions about a medical condition or this instruction, always ask your healthcare professional. [address] any warranty or liability for your use of this information. Learning About Colonoscopy What is a colonoscopy? A colonoscopy is a test (also called a procedure) that lets a doctor look inside your large intestine. The doctor uses a thin, lighted tube called a colonoscope. The doctor uses it to look for small growths called polyps, colon or rectal cancer (colorectal cancer), or other problems like bleeding. During the procedure, the doctor can take samples of tissue. The samples can then be checked for cancer or other conditions. The doctor can also take out polyps. How is colonoscopy done? This procedure is done in a doctor's office or a clinic or hospital. You will get medicine to help you relax and not feel pain. Some people find that they do not remember having the test because of the medicine. The doctor gently moves the colonoscope, or scope, through the colon. The scope is also a small video camera. It lets the doctor see the colon and take pictures. A colonoscopy usually takes 30 to 45 minutes. It may take longer if the doctor has to remove polyps. How do you prepare for the procedure?  
You need to clean out your colon before the procedure so the doctor can see all of your colon. You may start the cleaning process a day or two before the test. This depends on which \"colon prep\" your doctor recommends. To clean your colon, you stop eating solid foods and drink only clear liquids. You can have water, tea, coffee, clear juices, clear broths, flavored ice pops, and gelatin (such as Jell-O). Do not drink anything red or purple, such as grape juice or fruit punch. And do not eat red or purple foods, such as grape ice pops or cherry gelatin. The day or night before the procedure, you drink a large amount of a special liquid. This causes loose, frequent stools. You will go to the bathroom a lot. It is very important to drink all of the colon prep liquid. If you have problems drinking the liquid, call your doctor. For many people, the prep is worse than the test. It may be uncomfortable, and you may feel hungry on the clear liquid diet. Some people do not go to work or do their usual activities on the day of the prep. Arrange to have someone take you home after the test. 
What can you expect after a colonoscopy? The nurses will watch you for 1 to 2 hours until the medicines wear off. Then you can go home. You will need a ride. Your doctor will tell you when you can eat and do your usual activities. Your doctor will talk to you about when you will need your next colonoscopy. The results of your test and your risk for colorectal cancer will help your doctor decide how often you need to be checked. Follow-up care is a key part of your treatment and safety. Be sure to make and go to all appointments, and call your doctor if you are having problems. It's also a good idea to know your test results and keep a list of the medicines you take. Where can you learn more? Go to http://archana-kaz.info/. Enter Z183 in the search box to learn more about \"Learning About Colonoscopy. \" Current as of: May 12, 2017 Content Version: 11.7 © 5918-4536 [employer], [employer]orporated. Care instructions adapted under license by [employer] (which disclaims liability or warranty for this information). If you have questions about a medical condition or this instruction, always ask your healthcare professional. [address] any warranty or liability for your use of this information. Introducing [facility]! [employer] introduces [employer]e Link patient portal. Now you can access parts of your medical record, email your doctor's office, and request medication refills online. 1. In your internet browser, go to [email or web address] Fantasy [email or web address] 2. Click on the First Time User? Click Here link in the Sign In box. You will see the New Member Sign Up page. 3. Enter your C[employer]e Link Access Code exactly as it appears below. You will not need to use this code after youve completed the sign-up process. If you do not sign up before the expiration date, you must request a new code. · [employer] Link Access Code: [email or web address] Expires: 10/23/2018  2:15 PM 
 
4. Enter the last four digits of your Social Security Number (xxxx) and Date of Birth (mm/dd/yyyy) as indicated and click Submit. You will be taken to the next sign-up page. 5. Create a Cone[employer]e Link ID. This will be your C[employer]e Link login ID and cannot be changed, so think of one that is secure and easy to remember. 6. Create a [employer]e Link password. You can change your password at any time. 7. Enter your Password Reset Question and Answer. This can be used at a later time if you forget your password. 8. Enter your e-mail address. You will receive e-mail notification when new information is available in [address] 9. Click Sign Up. You can now view and download portions of your medical record. 10. Click the Download Summary menu link to download a portable copy of your medical information.  
 
If you have questions, please visit the Frequently Asked Questions section of the [employer]. Remember, Bubble[employer]t is NOT to be used for urgent needs. For medical emergencies, dial 911. Now available from your iPhone and Android! Please provide this summary of care documentation to your next provider. Your primary care clinician is listed as Gil Mccray. If you have any questions after today's visit, please call [phone].

## 2018-07-25 NOTE — PATIENT INSTRUCTIONS
Well Visit, Men 48 to 72: Care Instructions Your Care Instructions Physical exams can help you stay healthy. Your doctor has checked your overall health and may have suggested ways to take good care of yourself. He or she also may have recommended tests. At home, you can help prevent illness with healthy eating, regular exercise, and other steps. Follow-up care is a key part of your treatment and safety. Be sure to make and go to all appointments, and call your doctor if you are having problems. It's also a good idea to know your test results and keep a list of the medicines you take. How can you care for yourself at home? · Reach and stay at a healthy weight. This will lower your risk for many problems, such as obesity, diabetes, heart disease, and high blood pressure. · Get at least 30 minutes of exercise on most days of the week. Walking is a good choice. You also may want to do other activities, such as running, swimming, cycling, or playing tennis or team sports. · Do not smoke. Smoking can make health problems worse. If you need help quitting, talk to your doctor about stop-smoking programs and medicines. These can increase your chances of quitting for good. · Protect your skin from too much sun. When you're outdoors from 10 a.m. to 4 p.m., stay in the shade or cover up with clothing and a hat with a wide brim. Wear sunglasses that block UV rays. Even when it's cloudy, put broad-spectrum sunscreen (SPF 30 or higher) on any exposed skin. · See a dentist one or two times a year for checkups and to have your teeth cleaned. · Wear a seat belt in the car. · Limit alcohol to 2 drinks a day. Too much alcohol can cause health problems. Follow your doctor's advice about when to have certain tests. These tests can spot problems early. · Cholesterol.  Your doctor will tell you how often to have this done based on your overall health and other things that can increase your risk for heart attack and stroke. · Blood pressure. Have your blood pressure checked during a routine doctor visit. Your doctor will tell you how often to check your blood pressure based on your age, your blood pressure results, and other factors. · Prostate exam. Talk to your doctor about whether you should have a blood test (called a PSA test) for prostate cancer. Experts disagree on whether men should have this test. Some experts recommend that you discuss the benefits and risks of the test with your doctor. · Diabetes. Ask your doctor whether you should have tests for diabetes. · Vision. Some experts recommend that you have yearly exams for glaucoma and other age-related eye problems starting at age 48. · Hearing. Tell your doctor if you notice any change in your hearing. You can have tests to find out how well you hear. · Colon cancer. You should begin tests for colon cancer at age 48. You may have one of several tests. Your doctor will tell you how often to have tests based on your age and risk. Risks include whether you already had a precancerous polyp removed from your colon or whether your parent, brother, sister, or child has had colon cancer. · Heart attack and stroke risk. At least every 4 to 6 years, you should have your risk for heart attack and stroke assessed. Your doctor uses factors such as your age, blood pressure, cholesterol, and whether you smoke or have diabetes to show what your risk for a heart attack or stroke is over the next 10 years. · Abdominal aortic aneurysm. Ask your doctor whether you should have a test to check for an aneurysm. You may need a test if you ever smoked or if your parent, brother, sister, or child has had an aneurysm. When should you call for help? Watch closely for changes in your health, and be sure to contact your doctor if you have any problems or symptoms that concern you. Where can you learn more? Go to http://archana-kaz.info/.  
Enter T241 in the search box to learn more about \"Well Visit, Men 48 to 72: Care Instructions. \" Current as of: Tameka 10, 2017 Content Version: 11.7 © 2039-1223 [employer]. Care instructions adapted under license by "[employer] (which disclaims liability or warranty for this information). If you have questions about a medical condition or this instruction, always ask your healthcare professional. [address] any warranty or liability for your use of this information. Well Visit, Men 48 to 72: Care Instructions Your Care Instructions Physical exams can help you stay healthy. Your doctor has checked your overall health and may have suggested ways to take good care of yourself. He or she also may have recommended tests. At home, you can help prevent illness with healthy eating, regular exercise, and other steps. Follow-up care is a key part of your treatment and safety. Be sure to make and go to all appointments, and call your doctor if you are having problems. It's also a good idea to know your test results and keep a list of the medicines you take. How can you care for yourself at home? · Reach and stay at a healthy weight. This will lower your risk for many problems, such as obesity, diabetes, heart disease, and high blood pressure. · Get at least 30 minutes of exercise on most days of the week. Walking is a good choice. You also may want to do other activities, such as running, swimming, cycling, or playing tennis or team sports. · Do not smoke. Smoking can make health problems worse. If you need help quitting, talk to your doctor about stop-smoking programs and medicines. These can increase your chances of quitting for good. · Protect your skin from too much sun. When you're outdoors from 10 a.m. to 4 p.m., stay in the shade or cover up with clothing and a hat with a wide brim. Wear sunglasses that block UV rays.  Even when it's cloudy, put broad-spectrum sunscreen (SPF 30 or higher) on any exposed skin. · See a dentist one or two times a year for checkups and to have your teeth cleaned. · Wear a seat belt in the car. · Limit alcohol to 2 drinks a day. Too much alcohol can cause health problems. Follow your doctor's advice about when to have certain tests. These tests can spot problems early. · Cholesterol. Your doctor will tell you how often to have this done based on your overall health and other things that can increase your risk for heart attack and stroke. · Blood pressure. Have your blood pressure checked during a routine doctor visit. Your doctor will tell you how often to check your blood pressure based on your age, your blood pressure results, and other factors. · Prostate exam. Talk to your doctor about whether you should have a blood test (called a PSA test) for prostate cancer. Experts disagree on whether men should have this test. Some experts recommend that you discuss the benefits and risks of the test with your doctor. · Diabetes. Ask your doctor whether you should have tests for diabetes. · Vision. Some experts recommend that you have yearly exams for glaucoma and other age-related eye problems starting at age 48. · Hearing. Tell your doctor if you notice any change in your hearing. You can have tests to find out how well you hear. · Colon cancer. You should begin tests for colon cancer at age 48. You may have one of several tests. Your doctor will tell you how often to have tests based on your age and risk. Risks include whether you already had a precancerous polyp removed from your colon or whether your parent, brother, sister, or child has had colon cancer. · Heart attack and stroke risk. At least every 4 to 6 years, you should have your risk for heart attack and stroke assessed.  Your doctor uses factors such as your age, blood pressure, cholesterol, and whether you smoke or have diabetes to show what your risk for a heart attack or stroke is over the next 10 years. · Abdominal aortic aneurysm. Ask your doctor whether you should have a test to check for an aneurysm. You may need a test if you ever smoked or if your parent, brother, sister, or child has had an aneurysm. When should you call for help? Watch closely for changes in your health, and be sure to contact your doctor if you have any problems or symptoms that concern you. Where can you learn more? Go to http://archana-kaz.info/. Enter J045 in the search box to learn more about \"Well Visit, Men 48 to 72: Care Instructions. \" Current as of: Tameka 10, 2017 Content Version: 11.7 © 0700-1593 "[employer]. Care instructions adapted under license by [employer] (which disclaims liability or warranty for this information). If you have questions about a medical condition or this instruction, always ask your healthcare professional. [place] [address] any warranty or liability for your use of this information. A Healthy Lifestyle: Care Instructions Your Care Instructions A healthy lifestyle can help you feel good, stay at a healthy weight, and have plenty of energy for both work and play. A healthy lifestyle is something you can share with your whole family. A healthy lifestyle also can lower your risk for serious health problems, such as high blood pressure, heart disease, and diabetes. You can follow a few steps listed below to improve your health and the health of your family. Follow-up care is a key part of your treatment and safety. Be sure to make and go to all appointments, and call your doctor if you are having problems. It's also a good idea to know your test results and keep a list of the medicines you take. How can you care for yourself at home? · Do not eat too much sugar, fat, or fast foods. You can still have dessert and treats now and then. The goal is moderation. · Start small to improve your eating habits.  Pay attention to portion sizes, drink less juice and soda pop, and eat more fruits and vegetables. ¨ Eat a healthy amount of food. A 3-ounce serving of meat, for example, is about the size of a deck of cards. Fill the rest of your plate with vegetables and whole grains. ¨ Limit the amount of soda and sports drinks you have every day. Drink more water when you are thirsty. ¨ Eat at least 5 servings of fruits and vegetables every day. It may seem like a lot, but it is not hard to reach this goal. A serving or helping is 1 piece of fruit, 1 cup of vegetables, or 2 cups of leafy, raw vegetables. Have an apple or some carrot sticks as an afternoon snack instead of a candy bar. Try to have fruits and/or vegetables at every meal. 
· Make exercise part of your daily routine. You may want to start with simple activities, such as walking, bicycling, or slow swimming. Try to be active 30 to 60 minutes every day. You do not need to do all 30 to 60 minutes all at once. For example, you can exercise 3 times a day for 10 or 20 minutes. Moderate exercise is safe for most people, but it is always a good idea to talk to your doctor before starting an exercise program. 
· Keep moving. [place] the lawn, work in the garden, or [place]. Take the stairs instead of the elevator at work. · If you smoke, quit. People who smoke have an increased risk for heart attack, stroke, cancer, and other lung illnesses. Quitting is hard, but there are ways to boost your chance of quitting tobacco for good. ¨ Use nicotine gum, patches, or lozenges. ¨ Ask your doctor about stop-smoking programs and medicines. ¨ Keep trying. In addition to reducing your risk of diseases in the future, you will notice some benefits soon after you stop using tobacco. If you have shortness of breath or asthma symptoms, they will likely get better within a few weeks after you quit. · Limit how much alcohol you drink.  Moderate amounts of alcohol (up to 2 drinks a day for men, 1 drink a day for women) are okay. But drinking too much can lead to liver problems, high blood pressure, and other health problems. Family health If you have a family, there are many things you can do together to improve your health. · Eat meals together as a family as often as possible. · Eat healthy foods. This includes fruits, vegetables, lean meats and dairy, and whole grains. · Include your family in your fitness plan. Most people think of activities such as jogging or tennis as the way to fitness, but there are many ways you and your family can be more active. Anything that makes you breathe hard and gets your heart pumping is exercise. Here are some tips: 
¨ Walk to do errands or to take your child to school or the bus. ¨ Go for a family bike ride after dinner instead of watching TV. Where can you learn more? Go to http://[employer].kaz.info/. Enter F544 in the search box to learn more about \"A Healthy Lifestyle: Care Instructions. \" Current as of: December 7, 2017 Content Version: 11.7 © 2082-3773 [employer]. Care instructions adapted under license by [employer] (which disclaims liability or warranty for this information). If you have questions about a medical condition or this instruction, always ask your healthcare professional. [address] any warranty or liability for your use of this information. Learning About Colonoscopy What is a colonoscopy? A colonoscopy is a test (also called a procedure) that lets a doctor look inside your large intestine. The doctor uses a thin, lighted tube called a colonoscope. The doctor uses it to look for small growths called polyps, colon or rectal cancer (colorectal cancer), or other problems like bleeding. During the procedure, the doctor can take samples of tissue. The samples can then be checked for cancer or other conditions. The doctor can also take out polyps.  
How is colonoscopy done? 
This procedure is done in a doctor's office or a clinic or hospital. You will get medicine to help you relax and not feel pain. Some people find that they do not remember having the test because of the medicine. The doctor gently moves the colonoscope, or scope, through the colon. The scope is also a small video camera. It lets the doctor see the colon and take pictures. A colonoscopy usually takes 30 to 45 minutes. It may take longer if the doctor has to remove polyps. How do you prepare for the procedure? You need to clean out your colon before the procedure so the doctor can see all of your colon. You may start the cleaning process a day or two before the test. This depends on which \"colon prep\" your doctor recommends. To clean your colon, you stop eating solid foods and drink only clear liquids. You can have water, tea, coffee, clear juices, clear broths, flavored ice pops, and gelatin (such as Jell-O). Do not drink anything red or purple, such as grape juice or fruit punch. And do not eat red or purple foods, such as grape ice pops or cherry gelatin. The day or night before the procedure, you drink a large amount of a special liquid. This causes loose, frequent stools. You will go to the bathroom a lot. It is very important to drink all of the colon prep liquid. If you have problems drinking the liquid, call your doctor. For many people, the prep is worse than the test. It may be uncomfortable, and you may feel hungry on the clear liquid diet. Some people do not go to work or do their usual activities on the day of the prep. Arrange to have someone take you home after the test. 
What can you expect after a colonoscopy? The nurses will watch you for 1 to 2 hours until the medicines wear off. Then you can go home. You will need a ride. Your doctor will tell you when you can eat and do your usual activities. Your doctor will talk to you about when you will need your next colonoscopy.  The results of your test and your risk for colorectal cancer will help your doctor decide how often you need to be checked. Follow-up care is a key part of your treatment and safety. Be sure to make and go to all appointments, and call your doctor if you are having problems. It's also a good idea to know your test results and keep a list of the medicines you take. Where can you learn more? Go to http://archana-kaz.info/. Enter Z251 in the search box to learn more about \"Learning About Colonoscopy. \" Current as of: May 12, 2017 Content Version: 11.7 © 6893-6998 [employer]. Care instructions adapted under license by [employer] (which disclaims liability or warranty for this information). If you have questions about a medical condition or this instruction, always ask your healthcare professional. [address] any warranty or liability for your use of this information.

## 2018-07-25 NOTE — PROGRESS NOTES
[facility] PRACTICE  OFFICE PROCEDURE PROGRESS NOTE    Chart reviewed for the following:   Soledad Briggs MD, have reviewed the History, Physical and updated the Allergic reactions for Luisa Small     TIME OUT performed immediately prior to start of procedure:   I, Orville Regalado MD, have performed the following reviews on Luisa Small prior to the start of the procedure:            * Patient was identified by name and date of birth   * Agreement on procedure being performed was verified  * Risks and Benefits explained to the patient - scarring, keloid formation. * Procedure site verified and marked as necessary  * Patient was positioned for comfort  * Consent was signed and verified     Time: 2:40pm      Date of procedure: 7/25/2018    Procedure performed by: Orville Regalado MD    Provider assisted by: MD    Patient assisted by: self     Liquid nitrogen applied to the warts on left side of chin for 2- 10 sec cycles each. Tolerated well. Monitor to resolution.

## 2018-07-25 NOTE — PROGRESS NOTES
Chief Complaint   Patient presents with    Physical     Colonoscopy: has not had     1. Have you been to the ER, urgent care clinic since your last visit? Hospitalized since your last visit? No    2. Have you seen or consulted any other health care providers outside of the [facility] since your last visit? Include any pap smears or colon screening.  No

## 2018-07-25 NOTE — PROGRESS NOTES
Chief Complaint   Patient presents with    Physical     Subjective:   Dean Butterfield is a 50 y.o. male presenting for his annual checkup. ROS:  Feeling well. No dyspnea or chest pain on exertion. No abdominal pain, change in bowel habits, black or bloody stools. No urinary tract or prostatic symptoms. No neurological complaints. Specific concerns today:   Has had a recurrent wart on the left side of his chin. Was treated with cryotherapy at derm office for this a long time ago. Says it came back within the past week. Treated left achilles rupture with PT alone. Has no pain. Back to regular activity but nothing explosive. Current Outpatient Prescriptions   Medication Sig Dispense Refill    atorvastatin (LIPITOR) 40 mg tablet Take 1 Tab by mouth daily. 80 Tab 3     No Known Allergies  Past Medical History:   Diagnosis Date    Achilles rupture, left 2017    Achilles rupture, right 2013    Body mass index (BMI) of 25.0 to 29.9     Hypercholesterolemia      Past Surgical History:   Procedure Laterality Date    HX ORTHOPAEDIC  5/2/2013    right achilles repair    HX VASECTOMY  2002    HX WISDOM TEETH EXTRACTION  1985     Family History   Problem Relation Age of Onset    High Cholesterol Sister      Social History   Substance Use Topics    Smoking status: Never Smoker    Smokeless tobacco: Never Used    Alcohol use No      Objective:     Visit Vitals    /76 (BP 1 Location: Left arm, BP Patient Position: Sitting)    Pulse 74    Temp 98 °F (36.7 °C) (Oral)    Resp 14    Ht 5' 9.5\" (1.765 m)    Wt 188 lb (85.3 kg)    SpO2 98%    BMI 27.36 kg/m2     The patient appears well, alert, oriented x 3, in no distress. ENT normal.  Neck supple. No adenopathy or thyromegaly. RADHA. Lungs are clear, good air entry, no wheezes, rhonchi or rales. S1 and S2 normal, no murmurs, regular rate and rhythm. Abdomen is soft without tenderness, guarding, mass or organomegaly.   Extremities show no edema, normal peripheral pulses. Neurological is normal without focal findings. Skin:  He has a small cold sore on the left lower lip (says he gets these seldom). He has a verrucous lesion on his chin on the left side. It is about 4mm in size and ashy appearing. Psych: normal affect. Mood good. Oriented x 3. Judgement and insight intact. Assessment/Plan:       ICD-10-CM ICD-9-CM    1. Routine medical exam Z00.00 V70.0    2. Hypercholesterolemia E78.00 272.0 atorvastatin (LIPITOR) 40 mg tablet      CBC W/O DIFF      METABOLIC PANEL, COMPREHENSIVE      LIPID PANEL   3. Body mass index (BMI) of 25.0 to 29.9 E66.3 278.02    4. Prostate cancer screening Z12.5 V76.44 PSA SCREENING (SCREENING)   5. Colon cancer screening Z12.11 V76.51 REFERRAL TO GASTROENTEROLOGY   6. Wart of face B07.9 078.10 DESTRUC BENIGN LESION, UP TO 14 LESIONS   7. Achilles rupture, left, sequela S86.012S 905.8      Age and sex specific counseling. Diet and exercise due to BMI>25. Pt ed handouts. Cryotherapy to wart per attached procedure note. Advised on risks of scarring or keloid formation. Refer to GI for colon ca screening. Fasting labs ordered. All chart history elements were reviewed by me at the time of the visit even though marked at time of note closure. Patient understands our medical plan. Patient has provided input and agrees with goals. Alternatives have been explained and offered. All questions answered. The patient is to call if condition worsens or fails to improve. Follow-up Disposition:  Return in about 1 year (around 7/25/2019) for physical.  Fasting labs due at your earliest convenience.

## 2018-07-28 ENCOUNTER — HOSPITAL ENCOUNTER (OUTPATIENT)
Dept: LAB | Age: 53
Discharge: HOME | End: 2018-07-28
Payer: OTHER GOVERNMENT

## 2018-07-28 DIAGNOSIS — E78.00 HYPERCHOLESTEROLEMIA: ICD-10-CM

## 2018-07-28 DIAGNOSIS — Z12.5 PROSTATE CANCER SCREENING: ICD-10-CM

## 2018-07-28 LAB
ALBUMIN SERPL-MCNC: 4.2 G/DL (ref 3.4–5)
ALBUMIN/GLOB SERPL: 1.3 {RATIO} (ref 0.8–1.7)
ALP SERPL-CCNC: 81 U/L (ref 45–117)
ALT SERPL-CCNC: 31 U/L (ref 16–61)
ANION GAP SERPL CALC-SCNC: 7 MMOL/L (ref 3–18)
AST SERPL-CCNC: 13 U/L (ref 15–37)
BILIRUB SERPL-MCNC: 0.7 MG/DL (ref 0.2–1)
BUN SERPL-MCNC: 12 MG/DL (ref 7–18)
BUN/CREAT SERPL: 12 (ref 12–20)
CALCIUM SERPL-MCNC: 8.9 MG/DL (ref 8.5–10.1)
CHLORIDE SERPL-SCNC: 106 MMOL/L (ref 100–108)
CHOLEST SERPL-MCNC: 138 MG/DL
CO2 SERPL-SCNC: 28 MMOL/L (ref 21–32)
CREAT SERPL-MCNC: 1.02 MG/DL (ref 0.6–1.3)
ERYTHROCYTE [DISTWIDTH] IN BLOOD BY AUTOMATED COUNT: 13.3 % (ref 11.6–14.5)
GLOBULIN SER CALC-MCNC: 3.3 G/DL (ref 2–4)
GLUCOSE SERPL-MCNC: 98 MG/DL (ref 74–99)
HCT VFR BLD AUTO: 38.9 % (ref 36–48)
HDLC SERPL-MCNC: 48 MG/DL (ref 40–60)
HDLC SERPL: 2.9 {RATIO} (ref 0–5)
HGB BLD-MCNC: 13.4 G/DL (ref 13–16)
LDLC SERPL CALC-MCNC: 77.6 MG/DL (ref 0–100)
LIPID PROFILE,FLP: NORMAL
MCH RBC QN AUTO: 29.3 PG (ref 24–34)
MCHC RBC AUTO-ENTMCNC: 34.4 G/DL (ref 31–37)
MCV RBC AUTO: 85.1 FL (ref 74–97)
PLATELET # BLD AUTO: 245 K/UL (ref 135–420)
PMV BLD AUTO: 9.9 FL (ref 9.2–11.8)
POTASSIUM SERPL-SCNC: 4.1 MMOL/L (ref 3.5–5.5)
PROT SERPL-MCNC: 7.5 G/DL (ref 6.4–8.2)
PSA SERPL-MCNC: 0.8 NG/ML (ref 0–4)
RBC # BLD AUTO: 4.57 M/UL (ref 4.7–5.5)
SODIUM SERPL-SCNC: 141 MMOL/L (ref 136–145)
TRIGL SERPL-MCNC: 62 MG/DL (ref ?–150)
VLDLC SERPL CALC-MCNC: 12.4 MG/DL
WBC # BLD AUTO: 4.9 K/UL (ref 4.6–13.2)

## 2018-07-28 PROCEDURE — 85027 COMPLETE CBC AUTOMATED: CPT | Performed by: FAMILY MEDICINE

## 2018-07-28 PROCEDURE — 80053 COMPREHEN METABOLIC PANEL: CPT | Performed by: FAMILY MEDICINE

## 2018-07-28 PROCEDURE — 36415 COLL VENOUS BLD VENIPUNCTURE: CPT | Performed by: FAMILY MEDICINE

## 2018-07-28 PROCEDURE — 84153 ASSAY OF PSA TOTAL: CPT | Performed by: FAMILY MEDICINE

## 2018-07-28 PROCEDURE — 80061 LIPID PANEL: CPT | Performed by: FAMILY MEDICINE

## 2018-07-30 NOTE — PROGRESS NOTES
Please notify the patient that his labs were all within normal limits. Will repeat in 1 year, or sooner as needed.

## 2019-08-21 DIAGNOSIS — E78.00 HYPERCHOLESTEROLEMIA: ICD-10-CM

## 2019-08-21 RX ORDER — ATORVASTATIN CALCIUM 40 MG/1
40 TABLET, FILM COATED ORAL DAILY
Qty: 90 TAB | Refills: 0 | Status: SHIPPED | OUTPATIENT
Start: 2019-08-21 | End: 2019-08-28 | Stop reason: SDUPTHER

## 2019-08-28 ENCOUNTER — OFFICE VISIT (OUTPATIENT)
Dept: FAMILY MEDICINE CLINIC | Age: 54
End: 2019-08-28

## 2019-08-28 VITALS
BODY MASS INDEX: 26.48 KG/M2 | OXYGEN SATURATION: 98 % | HEIGHT: 70 IN | DIASTOLIC BLOOD PRESSURE: 70 MMHG | HEART RATE: 75 BPM | TEMPERATURE: 98.2 F | SYSTOLIC BLOOD PRESSURE: 128 MMHG | WEIGHT: 185 LBS | RESPIRATION RATE: 16 BRPM

## 2019-08-28 DIAGNOSIS — E78.00 HYPERCHOLESTEROLEMIA: ICD-10-CM

## 2019-08-28 DIAGNOSIS — Z00.00 ROUTINE MEDICAL EXAM: Primary | ICD-10-CM

## 2019-08-28 DIAGNOSIS — Z12.5 PROSTATE CANCER SCREENING: ICD-10-CM

## 2019-08-28 RX ORDER — ATORVASTATIN CALCIUM 40 MG/1
40 TABLET, FILM COATED ORAL DAILY
Qty: 90 TAB | Refills: 3 | Status: SHIPPED | OUTPATIENT
Start: 2019-08-28 | End: 2020-08-31 | Stop reason: SDUPTHER

## 2019-08-28 NOTE — PROGRESS NOTES
1. Have you been to the ER, urgent care clinic since your last visit? Hospitalized since your last visit? No    2. Have you seen or consulted any other health care providers outside of the [address] [place] [address] since your last visit? Include any pap smears or colon screening.  No

## 2019-08-28 NOTE — PROGRESS NOTES
Chief Complaint   Patient presents with    Physical     Subjective:   Cindy Jones is a 48 y.o. male presenting for his annual checkup. ROS:  Complete 10 system ROS is obtained from the patient which is negative. Specific concerns today:   Doing well without concerns. Current Outpatient Medications   Medication Sig Dispense Refill    atorvastatin (LIPITOR) 40 mg tablet Take 1 Tab by mouth daily. 80 Tab 3     No Known Allergies  Past Medical History:   Diagnosis Date    Achilles rupture, left 2017    Achilles rupture, right 2013    Body mass index (BMI) of 25.0 to 29.9     H/O colonoscopy 10/2018    Dr. Margaret Jacinto, no polyps, 10 year follow-up    Hypercholesterolemia      Past Surgical History:   Procedure Laterality Date    HX ORTHOPAEDIC  5/2/2013    right achilles repair    HX VASECTOMY  2002    HX WISDOM TEETH EXTRACTION  1985     Family History   Problem Relation Age of Onset    High Cholesterol Sister      Social History     Tobacco Use    Smoking status: Never Smoker    Smokeless tobacco: Never Used   Substance Use Topics    Alcohol use: No      Objective:     Visit Vitals  /70 (BP 1 Location: Right arm, BP Patient Position: Sitting)   Pulse 75   Temp 98.2 °F (36.8 °C) (Oral)   Resp 16   Ht 5' 9.5\" (1.765 m)   Wt 185 lb (83.9 kg)   SpO2 98%   BMI 26.93 kg/m²     The patient appears well, alert, oriented x 3, in no distress. ENT normal.  Neck supple. No adenopathy or thyromegaly. RADHA. Lungs are clear, good air entry, no wheezes, rhonchi or rales. S1 and S2 normal, no murmurs, regular rate and rhythm. Abdomen is soft without tenderness, guarding, mass or organomegaly. Extremities show no edema, normal peripheral pulses. Neurological is normal without focal findings. Psych: normal affect. Mood good. Oriented x 3. Judgement and insight intact. Assessment/Plan:       ICD-10-CM ICD-9-CM    1. Routine medical exam Z00.00 V70.0    2.  Hypercholesterolemia E78.00 272.0 CBC W/O DIFF      METABOLIC PANEL, COMPREHENSIVE      LIPID PANEL      atorvastatin (LIPITOR) 40 mg tablet   3. Body mass index (BMI) of 25.0 to 29.9 E66.3 278.02    4. Prostate cancer screening Z12.5 V76.44 PSA SCREENING (SCREENING)     Age and sex specific counseling. Pt ed handouts. Diet and exercise due to BMI>25. Fasting labs ordered. Refills of lipitor. Advised to obtain a shingles vaccine. All chart history elements were reviewed by me at the time of the visit even though marked at time of note closure. Patient understands our medical plan. Patient has provided input and agrees with goals. Alternatives have been explained and offered. All questions answered. The patient is to call if condition worsens or fails to improve. Follow-up and Dispositions    · Return in about 1 year (around 8/28/2020) for annual physical and fasting labs to be done in  1-4 days -.

## 2019-08-28 NOTE — PATIENT INSTRUCTIONS
[address]      Well Visit, Men 48 to 72: Care Instructions  Your Care Instructions    Physical exams can help you stay healthy. Your doctor has checked your overall health and may have suggested ways to take good care of yourself. He or she also may have recommended tests. At home, you can help prevent illness with healthy eating, regular exercise, and other steps. Follow-up care is a key part of your treatment and safety. Be sure to make and go to all appointments, and call your doctor if you are having problems. It's also a good idea to know your test results and keep a list of the medicines you take. How can you care for yourself at home? · Reach and stay at a healthy weight. This will lower your risk for many problems, such as obesity, diabetes, heart disease, and high blood pressure. · Get at least 30 minutes of exercise on most days of the week. Walking is a good choice. You also may want to do other activities, such as running, swimming, cycling, or playing tennis or team sports. · Do not smoke. Smoking can make health problems worse. If you need help quitting, talk to your doctor about stop-smoking programs and medicines. These can increase your chances of quitting for good. · Protect your skin from too much sun. When you're outdoors from 10 a.m. to 4 p.m., stay in the shade or cover up with clothing and a hat with a wide brim. Wear sunglasses that block UV rays. Even when it's cloudy, put broad-spectrum sunscreen (SPF 30 or higher) on any exposed skin. · See a dentist one or two times a year for checkups and to have your teeth cleaned. · Wear a seat belt in the car. Follow your doctor's advice about when to have certain tests. These tests can spot problems early. · Cholesterol. Your doctor will tell you how often to have this done based on your overall health and other things that can increase your risk for heart attack and stroke. · Blood pressure.  Have your blood pressure checked during a routine doctor visit. Your doctor will tell you how often to check your blood pressure based on your age, your blood pressure results, and other factors. · Prostate exam. Talk to your doctor about whether you should have a blood test (called a PSA test) for prostate cancer. Experts recommend that you discuss the benefits and risks of the test with your doctor before you decide whether to have this test.  · Diabetes. Ask your doctor whether you should have tests for diabetes. · Vision. Some experts recommend that you have yearly exams for glaucoma and other age-related eye problems starting at age 48. · Hearing. Tell your doctor if you notice any change in your hearing. You can have tests to find out how well you hear. · Colorectal cancer. Your risk for colorectal cancer gets higher as you get older. Some experts say that adults should start regular screening at age 48 and stop at age 76. Others say to start before age 48 or continue after age 76. Talk with your doctor about your risk and when to start and stop screening. · Heart attack and stroke risk. At least every 4 to 6 years, you should have your risk for heart attack and stroke assessed. Your doctor uses factors such as your age, blood pressure, cholesterol, and whether you smoke or have diabetes to show what your risk for a heart attack or stroke is over the next 10 years. · Abdominal aortic aneurysm. Ask your doctor whether you should have a test to check for an aneurysm. You may need a test if you ever smoked or if your parent, brother, sister, or child has had an aneurysm. When should you call for help? Watch closely for changes in your health, and be sure to contact your doctor if you have any problems or symptoms that concern you. Where can you learn more? Go to http://archana-kaz.info/. Enter T486 in the search box to learn more about \"Well Visit, Men 48 to 72: Care Instructions. \"  Current as of: December 13, 2018  Content Version: 12.1  © 4731-3455 [employer]. Care instructions adapted under license by [employer] (which disclaims liability or warranty for this information). If you have questions about a medical condition or this instruction, always ask your healthcare professional. [address] any warranty or liability for your use of this information. Earwax Blockage: Care Instructions  Your Care Instructions    Earwax is a natural substance that protects the ear canal. Normally, earwax drains from the ears and does not cause problems. Sometimes earwax builds up and hardens. Earwax blockage (also called cerumen impaction) can cause some loss of hearing and pain. When wax is tightly packed, you will need to have your doctor remove it. Follow-up care is a key part of your treatment and safety. Be sure to make and go to all appointments, and call your doctor if you are having problems. It's also a good idea to know your test results and keep a list of the medicines you take. How can you care for yourself at home? · Do not try to remove earwax with cotton swabs, fingers, or other objects. This can make the blockage worse and damage the eardrum. · If your doctor recommends that you try to remove earwax at home:  ? Soften and loosen the earwax with warm mineral oil. You also can try hydrogen peroxide mixed with an equal amount of room temperature water. Place 2 drops of the fluid, warmed to body temperature, in the ear two times a day for up to 5 days. ? Once the wax is loose and soft, all that is usually needed to remove it from the ear canal is a gentle, warm shower. Direct the water into the ear, then tip your head to let the earwax drain out. Dry your ear thoroughly with a hair dryer set on low. Hold the dryer several inches from your ear. ? If the warm mineral oil and shower do not work, use an over-the-counter wax softener.  Read and follow all instructions on the label. After using the wax softener, use an ear syringe to gently flush the ear. Make sure the flushing solution is body temperature. Cool or hot fluids in the ear can cause dizziness. When should you call for help? Call your doctor now or seek immediate medical care if:    · Pus or blood drains from your ear.     · Your ears are ringing or feel full.     · You have a loss of hearing.    Watch closely for changes in your health, and be sure to contact your doctor if:    · You have pain or reduced hearing after 1 week of home treatment.     · You have any new symptoms, such as nausea or balance problems. Where can you learn more? Go to http://archana-kaz.info/. Enter X358 in the search box to learn more about \"Earwax Blockage: Care Instructions. \"  Current as of: September 23, 2018  Content Version: 12.1  © 5823-4515 [employer]. Care instructions adapted under license by [employer] (which disclaims liability or warranty for this information). If you have questions about a medical condition or this instruction, always ask your healthcare professional. [address] any warranty or liability for your use of this information.

## 2019-09-14 ENCOUNTER — HOSPITAL ENCOUNTER (OUTPATIENT)
Dept: LAB | Age: 54
Discharge: HOME | End: 2019-09-14
Payer: OTHER GOVERNMENT

## 2019-09-14 DIAGNOSIS — Z12.5 PROSTATE CANCER SCREENING: ICD-10-CM

## 2019-09-14 DIAGNOSIS — E78.00 HYPERCHOLESTEROLEMIA: ICD-10-CM

## 2019-09-14 LAB
ALBUMIN SERPL-MCNC: 4.1 G/DL (ref 3.4–5)
ALBUMIN/GLOB SERPL: 1.3 {RATIO} (ref 0.8–1.7)
ALP SERPL-CCNC: 75 U/L (ref 45–117)
ALT SERPL-CCNC: 29 U/L (ref 16–61)
ANION GAP SERPL CALC-SCNC: 5 MMOL/L (ref 3–18)
AST SERPL-CCNC: 15 U/L (ref 10–38)
BILIRUB SERPL-MCNC: 0.6 MG/DL (ref 0.2–1)
BUN SERPL-MCNC: 9 MG/DL (ref 7–18)
BUN/CREAT SERPL: 9 (ref 12–20)
CALCIUM SERPL-MCNC: 9.1 MG/DL (ref 8.5–10.1)
CHLORIDE SERPL-SCNC: 106 MMOL/L (ref 100–111)
CHOLEST SERPL-MCNC: 137 MG/DL
CO2 SERPL-SCNC: 28 MMOL/L (ref 21–32)
CREAT SERPL-MCNC: 0.96 MG/DL (ref 0.6–1.3)
ERYTHROCYTE [DISTWIDTH] IN BLOOD BY AUTOMATED COUNT: 13.2 % (ref 11.6–14.5)
GLOBULIN SER CALC-MCNC: 3.1 G/DL (ref 2–4)
GLUCOSE SERPL-MCNC: 102 MG/DL (ref 74–99)
HCT VFR BLD AUTO: 38.1 % (ref 36–48)
HDLC SERPL-MCNC: 56 MG/DL (ref 40–60)
HDLC SERPL: 2.4 {RATIO} (ref 0–5)
HGB BLD-MCNC: 13 G/DL (ref 13–16)
LDLC SERPL CALC-MCNC: 70.8 MG/DL (ref 0–100)
LIPID PROFILE,FLP: NORMAL
MCH RBC QN AUTO: 29.3 PG (ref 24–34)
MCHC RBC AUTO-ENTMCNC: 34.1 G/DL (ref 31–37)
MCV RBC AUTO: 86 FL (ref 74–97)
PLATELET # BLD AUTO: 235 K/UL (ref 135–420)
PMV BLD AUTO: 9.9 FL (ref 9.2–11.8)
POTASSIUM SERPL-SCNC: 4.1 MMOL/L (ref 3.5–5.5)
PROT SERPL-MCNC: 7.2 G/DL (ref 6.4–8.2)
PSA SERPL-MCNC: 0.9 NG/ML (ref 0–4)
RBC # BLD AUTO: 4.43 M/UL (ref 4.7–5.5)
SODIUM SERPL-SCNC: 139 MMOL/L (ref 136–145)
TRIGL SERPL-MCNC: 51 MG/DL (ref ?–150)
VLDLC SERPL CALC-MCNC: 10.2 MG/DL
WBC # BLD AUTO: 4.1 K/UL (ref 4.6–13.2)

## 2019-09-14 PROCEDURE — 80061 LIPID PANEL: CPT

## 2019-09-14 PROCEDURE — 80053 COMPREHEN METABOLIC PANEL: CPT

## 2019-09-14 PROCEDURE — 85027 COMPLETE CBC AUTOMATED: CPT

## 2019-09-14 PROCEDURE — 84153 ASSAY OF PSA TOTAL: CPT

## 2019-09-14 PROCEDURE — 36415 COLL VENOUS BLD VENIPUNCTURE: CPT

## 2019-09-16 NOTE — PROGRESS NOTES
Nurse to advise that overall labs look good but but his blood sugar was ever so slightly elevated. Work on [employer] and regular exercise. Recheck yearly.

## 2019-09-16 NOTE — PROGRESS NOTES
Patient identifiers verified. Patient advised that overall labs look good but but his blood sugar was ever so slightly elevated. Work on [employer] and regular exercise. Recheck yearly.

## 2020-08-27 ENCOUNTER — TELEPHONE (OUTPATIENT)
Dept: FAMILY MEDICINE CLINIC | Age: 55
End: 2020-08-27

## 2020-08-27 NOTE — TELEPHONE ENCOUNTER
Have you been diagnosed with, tested for, or told that you are suspected of having COVID-19 (coronovirus)? No  Have you had a fever or taken medication to treat a fever in the past 72 hours? No  Have you had a cough, SOB, or flu-like symptoms within the past 3 days? No   Have you had direct contact with someone who tested positive for COVID-19 within the past 14 days? No   Do you have a household member with flu-like symptoms, including fever, cough, or SOB? No  Do you currently have flu-like symptoms including fever, cough, or SOB? No     Patient knows to call if anything changes over the weekend.

## 2020-08-31 ENCOUNTER — OFFICE VISIT (OUTPATIENT)
Dept: FAMILY MEDICINE CLINIC | Age: 55
End: 2020-08-31

## 2020-08-31 VITALS
SYSTOLIC BLOOD PRESSURE: 138 MMHG | HEIGHT: 70 IN | RESPIRATION RATE: 16 BRPM | WEIGHT: 183 LBS | DIASTOLIC BLOOD PRESSURE: 80 MMHG | BODY MASS INDEX: 26.2 KG/M2 | TEMPERATURE: 98.3 F | OXYGEN SATURATION: 98 % | HEART RATE: 54 BPM

## 2020-08-31 DIAGNOSIS — R03.0 ELEVATED BLOOD PRESSURE READING: ICD-10-CM

## 2020-08-31 DIAGNOSIS — E78.00 HYPERCHOLESTEROLEMIA: ICD-10-CM

## 2020-08-31 DIAGNOSIS — Z12.5 PROSTATE CANCER SCREENING: ICD-10-CM

## 2020-08-31 DIAGNOSIS — Z00.00 ROUTINE MEDICAL EXAM: Primary | ICD-10-CM

## 2020-08-31 DIAGNOSIS — R73.01 IFG (IMPAIRED FASTING GLUCOSE): ICD-10-CM

## 2020-08-31 RX ORDER — ATORVASTATIN CALCIUM 40 MG/1
40 TABLET, FILM COATED ORAL DAILY
Qty: 90 TAB | Refills: 3 | Status: SHIPPED | OUTPATIENT
Start: 2020-08-31 | End: 2021-09-07 | Stop reason: SDUPTHER

## 2020-08-31 NOTE — PROGRESS NOTES
Chief Complaint   Patient presents with    Physical     Subjective:   Ryder Fox is a 48 y.o. male presenting for his annual checkup. ROS:  Complete 10 system ROS is obtained from the patient which is negative. Specific concerns today:   Doing well without concerns. Has a high BP upon rooming today. No chest pain or dyspnea. No lightheadedness or dizziness. Current Outpatient Medications   Medication Sig Dispense Refill    atorvastatin (Lipitor) 40 mg tablet Take 1 Tab by mouth daily. 80 Tab 3     No Known Allergies  Past Medical History:   Diagnosis Date    Achilles rupture, left 2017    Achilles rupture, right 2013    Body mass index (BMI) of 25.0 to 29.9     H/O colonoscopy 10/2018    Dr. Maureen Souza, no polyps, 10 year follow-up    Hypercholesterolemia      Past Surgical History:   Procedure Laterality Date    HX ORTHOPAEDIC  5/2/2013    right achilles repair    HX VASECTOMY  2002    HX WISDOM TEETH EXTRACTION  1985     Family History   Problem Relation Age of Onset    High Cholesterol Sister      Social History     Tobacco Use    Smoking status: Never Smoker    Smokeless tobacco: Never Used   Substance Use Topics    Alcohol use: No      Objective:     Blood pressure 138/80, pulse (!) 54, temperature 98.3 °F (36.8 °C), temperature source Oral, resp. rate 16, height 5' 9.5\" (1.765 m), weight 183 lb (83 kg), SpO2 98 %. The patient appears well, alert, oriented x 3, in no distress. ENT normal.  Neck supple. No adenopathy or thyromegaly. RADHA. Lungs are clear, good air entry, no wheezes, rhonchi or rales. S1 and S2 normal, no murmurs, regular rate and rhythm. Abdomen is soft without tenderness, guarding, mass or organomegaly. Extremities show no edema. Neurological is normal without focal findings. Psych: normal affect. Mood good. Oriented x 3. Judgement and insight intact. Assessment/Plan:       ICD-10-CM ICD-9-CM    1.  Routine medical exam  Z00.00 V70.0 CBC WITH AUTOMATED DIFF      METABOLIC PANEL, COMPREHENSIVE      LIPID PANEL      HEMOGLOBIN A1C W/O EAG   2. Hypercholesterolemia  E78.00 272.0 atorvastatin (Lipitor) 40 mg tablet      METABOLIC PANEL, COMPREHENSIVE      LIPID PANEL   3. Prostate cancer screening  Z12.5 V76.44 PSA SCREENING (SCREENING)   4. IFG (impaired fasting glucose)  R73.01 790.21 HEMOGLOBIN A1C W/O EAG   5. Elevated blood pressure reading  R03.0 796.2      Age and sex specific counseling. Pt ed handouts. Diet and exercise due to BMI>25. Fasting labs ordered. Refills of lipitor. Advised to obtain a shingles vaccine once again. Recheck BP a bit better in prehypertensive range on my recheck. Recheck again in office in 6 months. Consider home cuff with log and bring both to next OV if he gets one. Work on [place]. All chart history elements were reviewed by me at the time of the visit even though marked at time of note closure. Patient understands our medical plan. Patient has provided input and agrees with goals. Alternatives have been explained and offered. All questions answered. The patient is to call if condition worsens or fails to improve. Follow-up and Dispositions    · Return in about 1 year (around 8/31/2021).

## 2020-08-31 NOTE — PROGRESS NOTES
1. Have you been to the ER, urgent care clinic since your last visit? Hospitalized since your last visit? No    2. Have you seen or consulted any other health care providers outside of the [address] [place] [address] since your last visit? Include any pap smears or colon screening.  No

## 2020-08-31 NOTE — PATIENT INSTRUCTIONS
Well Visit, Men 48 to 72: Care Instructions Your Care Instructions Physical exams can help you stay healthy. Your doctor has checked your overall health and may have suggested ways to take good care of yourself. He or she also may have recommended tests. At home, you can help prevent illness with healthy eating, regular exercise, and other steps. Follow-up care is a key part of your treatment and safety. Be sure to make and go to all appointments, and call your doctor if you are having problems. It's also a good idea to know your test results and keep a list of the medicines you take. How can you care for yourself at home? · Reach and stay at a healthy weight. This will lower your risk for many problems, such as obesity, diabetes, heart disease, and high blood pressure. · Get at least 30 minutes of exercise on most days of the week. Walking is a good choice. You also may want to do other activities, such as running, swimming, cycling, or playing tennis or team sports. · Do not smoke. Smoking can make health problems worse. If you need help quitting, talk to your doctor about stop-smoking programs and medicines. These can increase your chances of quitting for good. · Protect your skin from too much sun. When you're outdoors from 10 a.m. to 4 p.m., stay in the shade or cover up with clothing and a hat with a wide brim. Wear sunglasses that block UV rays. Even when it's cloudy, put broad-spectrum sunscreen (SPF 30 or higher) on any exposed skin. · See a dentist one or two times a year for checkups and to have your teeth cleaned. · Wear a seat belt in the car. Follow your doctor's advice about when to have certain tests. These tests can spot problems early. · Cholesterol. Your doctor will tell you how often to have this done based on your overall health and other things that can increase your risk for heart attack and stroke. · Blood pressure.  Have your blood pressure checked during a routine doctor visit. Your doctor will tell you how often to check your blood pressure based on your age, your blood pressure results, and other factors. · Prostate exam. Talk to your doctor about whether you should have a blood test (called a PSA test) for prostate cancer. Experts recommend that you discuss the benefits and risks of the test with your doctor before you decide whether to have this test. 
· Diabetes. Ask your doctor whether you should have tests for diabetes. · Vision. Some experts recommend that you have yearly exams for glaucoma and other age-related eye problems starting at age 48. · Hearing. Tell your doctor if you notice any change in your hearing. You can have tests to find out how well you hear. · Colorectal cancer. Your risk for colorectal cancer gets higher as you get older. Some experts say that adults should start regular screening at age 48 and stop at age 76. Others say to start before age 48 or continue after age 76. Talk with your doctor about your risk and when to start and stop screening. · Heart attack and stroke risk. At least every 4 to 6 years, you should have your risk for heart attack and stroke assessed. Your doctor uses factors such as your age, blood pressure, cholesterol, and whether you smoke or have diabetes to show what your risk for a heart attack or stroke is over the next 10 years. · Abdominal aortic aneurysm. Ask your doctor whether you should have a test to check for an aneurysm. You may need a test if you ever smoked or if your parent, brother, sister, or child has had an aneurysm. When should you call for help? Watch closely for changes in your health, and be sure to contact your doctor if you have any problems or symptoms that concern you. Where can you learn more? Go to http://archana-kaz.info/ Enter R911 in the search box to learn more about \"Well Visit, Men 48 to 72: Care Instructions. \" 
 Current as of: August 22, 2019               Content Version: 12.5 © 0673-1480 [employer] Care instructions adapted under license by [employer] (which disclaims liability or warranty for this information). If you have questions about a medical condition or this instruction, always ask your healthcare professional. [address] any warranty or liability for your use of this information. DASH Diet: Care Instructions Your Care Instructions The DASH diet is an eating plan that can help lower your blood pressure. DASH stands for Dietary Approaches to Stop Hypertension. Hypertension is high blood pressure. The DASH diet focuses on eating foods that are high in calcium, potassium, and magnesium. These nutrients can lower blood pressure. The foods that are highest in these nutrients are fruits, vegetables, low-fat dairy products, nuts, seeds, and legumes. But taking calcium, potassium, and magnesium supplements instead of eating foods that are high in those nutrients does not have the same effect. The DASH diet also includes whole grains, fish, and poultry. The DASH diet is one of several lifestyle changes your doctor may recommend to lower your high blood pressure. Your doctor may also want you to decrease the amount of sodium in your diet. Lowering sodium while following the DASH diet can lower blood pressure even further than just the DASH diet alone. Follow-up care is a key part of your treatment and safety. Be sure to make and go to all appointments, and call your doctor if you are having problems. It's also a good idea to know your test results and keep a list of the medicines you take. How can you care for yourself at home? Following the DASH diet · Eat 4 to 5 servings of fruit each day.  A serving is 1 medium-sized piece of fruit, ½ cup chopped or canned fruit, 1/4 cup dried fruit, or 4 ounces (½ cup) of fruit juice. Choose fruit more often than fruit juice. · Eat 4 to 5 servings of vegetables each day. A serving is 1 cup of lettuce or raw leafy vegetables, ½ cup of chopped or cooked vegetables, or 4 ounces (½ cup) of vegetable juice. Choose vegetables more often than vegetable juice. · Get 2 to 3 servings of low-fat and fat-free dairy each day. A serving is 8 ounces of milk, 1 cup of yogurt, or 1 ½ ounces of cheese. · Eat 6 to 8 servings of grains each day. A serving is 1 slice of bread, 1 ounce of dry cereal, or ½ cup of cooked rice, pasta, or cooked cereal. Try to choose whole-grain products as much as possible. · Limit lean meat, poultry, and fish to 2 servings each day. A serving is 3 ounces, about the size of a deck of cards. · Eat 4 to 5 servings of nuts, seeds, and legumes (cooked dried beans, lentils, and split peas) each week. A serving is 1/3 cup of nuts, 2 tablespoons of seeds, or ½ cup of cooked beans or peas. · Limit fats and oils to 2 to 3 servings each day. A serving is 1 teaspoon of vegetable oil or 2 tablespoons of salad dressing. · Limit sweets and added sugars to 5 servings or less a week. A serving is 1 tablespoon jelly or jam, ½ cup sorbet, or 1 cup of lemonade. · Eat less than 2,300 milligrams (mg) of sodium a day. If you limit your sodium to 1,500 mg a day, you can lower your blood pressure even more. Tips for success · Start small. Do not try to make dramatic changes to your diet all at once. You might feel that you are missing out on your favorite foods and then be more likely to not follow the plan. Make small changes, and stick with them. Once those changes become habit, add a few more changes. · Try some of the following: ? Make it a goal to eat a fruit or vegetable at every meal and at snacks. This will make it easy to get the recommended amount of fruits and vegetables each day. ? Try yogurt topped with fruit and nuts for a snack or healthy dessert. ? Add lettuce, tomato, cucumber, and onion to sandwiches. ? Combine a ready-made pizza crust with low-fat mozzarella cheese and lots of vegetable toppings. Try using tomatoes, squash, spinach, broccoli, carrots, cauliflower, and onions. ? Have a variety of cut-up vegetables with a low-fat dip as an appetizer instead of chips and dip. ? Sprinkle sunflower seeds or chopped almonds over salads. Or try adding chopped walnuts or almonds to cooked vegetables. ? Try some vegetarian meals using beans and peas. Add garbanzo or kidney beans to salads. Make burritos and tacos with mashed parekh beans or black beans. Where can you learn more? Go to http://archana-kaz.info/ Enter Q846 in the search box to learn more about \"DASH Diet: Care Instructions. \" Current as of: December 16, 2019               Content Version: 12.5 © 4265-9729 [employer]. Care instructions adapted under license by [employer] (which disclaims liability or warranty for this information). If you have questions about a medical condition or this instruction, always ask your healthcare professional. [address] any warranty or liability for your use of this information.

## 2020-09-01 ENCOUNTER — HOSPITAL ENCOUNTER (OUTPATIENT)
Dept: LAB | Age: 55
Discharge: HOME | End: 2020-09-01
Payer: OTHER GOVERNMENT

## 2020-09-01 DIAGNOSIS — Z00.00 ROUTINE MEDICAL EXAM: ICD-10-CM

## 2020-09-01 DIAGNOSIS — Z12.5 PROSTATE CANCER SCREENING: ICD-10-CM

## 2020-09-01 DIAGNOSIS — R73.01 IFG (IMPAIRED FASTING GLUCOSE): ICD-10-CM

## 2020-09-01 DIAGNOSIS — E78.00 HYPERCHOLESTEROLEMIA: ICD-10-CM

## 2020-09-01 LAB
ALBUMIN SERPL-MCNC: 4.1 G/DL (ref 3.4–5)
ALBUMIN/GLOB SERPL: 1.3 {RATIO} (ref 0.8–1.7)
ALP SERPL-CCNC: 80 U/L (ref 45–117)
ALT SERPL-CCNC: 24 U/L (ref 16–61)
ANION GAP SERPL CALC-SCNC: 1 MMOL/L (ref 3–18)
AST SERPL-CCNC: 14 U/L (ref 10–38)
BASOPHILS # BLD: 0 K/UL (ref 0–0.1)
BASOPHILS NFR BLD: 0 % (ref 0–2)
BILIRUB SERPL-MCNC: 0.6 MG/DL (ref 0.2–1)
BUN SERPL-MCNC: 10 MG/DL (ref 7–18)
BUN/CREAT SERPL: 10 (ref 12–20)
CALCIUM SERPL-MCNC: 9.1 MG/DL (ref 8.5–10.1)
CHLORIDE SERPL-SCNC: 110 MMOL/L (ref 100–111)
CHOLEST SERPL-MCNC: 125 MG/DL
CO2 SERPL-SCNC: 31 MMOL/L (ref 21–32)
CREAT SERPL-MCNC: 0.98 MG/DL (ref 0.6–1.3)
DIFFERENTIAL METHOD BLD: ABNORMAL
EOSINOPHIL # BLD: 0.1 K/UL (ref 0–0.4)
EOSINOPHIL NFR BLD: 2 % (ref 0–5)
ERYTHROCYTE [DISTWIDTH] IN BLOOD BY AUTOMATED COUNT: 13.4 % (ref 11.6–14.5)
GLOBULIN SER CALC-MCNC: 3.1 G/DL (ref 2–4)
GLUCOSE SERPL-MCNC: 110 MG/DL (ref 74–99)
HBA1C MFR BLD: 6.3 % (ref 4.2–5.6)
HCT VFR BLD AUTO: 37.5 % (ref 36–48)
HDLC SERPL-MCNC: 53 MG/DL (ref 40–60)
HDLC SERPL: 2.4 {RATIO} (ref 0–5)
HGB BLD-MCNC: 12.7 G/DL (ref 13–16)
LDLC SERPL CALC-MCNC: 62.6 MG/DL (ref 0–100)
LIPID PROFILE,FLP: NORMAL
LYMPHOCYTES # BLD: 1.3 K/UL (ref 0.9–3.6)
LYMPHOCYTES NFR BLD: 35 % (ref 21–52)
MCH RBC QN AUTO: 29.5 PG (ref 24–34)
MCHC RBC AUTO-ENTMCNC: 33.9 G/DL (ref 31–37)
MCV RBC AUTO: 87 FL (ref 74–97)
MONOCYTES # BLD: 0.3 K/UL (ref 0.05–1.2)
MONOCYTES NFR BLD: 9 % (ref 3–10)
NEUTS SEG # BLD: 2.1 K/UL (ref 1.8–8)
NEUTS SEG NFR BLD: 54 % (ref 40–73)
PLATELET # BLD AUTO: 237 K/UL (ref 135–420)
PMV BLD AUTO: 10.4 FL (ref 9.2–11.8)
POTASSIUM SERPL-SCNC: 4.2 MMOL/L (ref 3.5–5.5)
PROT SERPL-MCNC: 7.2 G/DL (ref 6.4–8.2)
PSA SERPL-MCNC: 1.2 NG/ML (ref 0–4)
RBC # BLD AUTO: 4.31 M/UL (ref 4.7–5.5)
SODIUM SERPL-SCNC: 142 MMOL/L (ref 136–145)
TRIGL SERPL-MCNC: 47 MG/DL (ref ?–150)
VLDLC SERPL CALC-MCNC: 9.4 MG/DL
WBC # BLD AUTO: 3.8 K/UL (ref 4.6–13.2)

## 2020-09-01 PROCEDURE — 80061 LIPID PANEL: CPT

## 2020-09-01 PROCEDURE — 36415 COLL VENOUS BLD VENIPUNCTURE: CPT

## 2020-09-01 PROCEDURE — 80053 COMPREHEN METABOLIC PANEL: CPT

## 2020-09-01 PROCEDURE — 83036 HEMOGLOBIN GLYCOSYLATED A1C: CPT

## 2020-09-01 PROCEDURE — 84153 ASSAY OF PSA TOTAL: CPT

## 2020-09-01 PROCEDURE — 85025 COMPLETE CBC W/AUTO DIFF WBC: CPT

## 2021-03-03 ENCOUNTER — TELEPHONE (OUTPATIENT)
Dept: FAMILY MEDICINE CLINIC | Age: 56
End: 2021-03-03

## 2021-03-04 ENCOUNTER — OFFICE VISIT (OUTPATIENT)
Dept: FAMILY MEDICINE CLINIC | Age: 56
End: 2021-03-04
Payer: OTHER GOVERNMENT

## 2021-03-04 VITALS
TEMPERATURE: 98.3 F | DIASTOLIC BLOOD PRESSURE: 90 MMHG | RESPIRATION RATE: 14 BRPM | OXYGEN SATURATION: 99 % | HEIGHT: 70 IN | WEIGHT: 177 LBS | HEART RATE: 69 BPM | BODY MASS INDEX: 25.34 KG/M2 | SYSTOLIC BLOOD PRESSURE: 148 MMHG

## 2021-03-04 DIAGNOSIS — R03.0 ELEVATED BLOOD PRESSURE READING: ICD-10-CM

## 2021-03-04 DIAGNOSIS — R73.03 PREDIABETES: ICD-10-CM

## 2021-03-04 DIAGNOSIS — Z12.5 PROSTATE CANCER SCREENING: ICD-10-CM

## 2021-03-04 DIAGNOSIS — R03.0 ELEVATED BLOOD PRESSURE READING: Primary | ICD-10-CM

## 2021-03-04 DIAGNOSIS — E78.00 HYPERCHOLESTEROLEMIA: ICD-10-CM

## 2021-03-04 LAB — HBA1C MFR BLD HPLC: 5.6 %

## 2021-03-04 PROCEDURE — 99214 OFFICE O/P EST MOD 30 MIN: CPT | Performed by: FAMILY MEDICINE

## 2021-03-04 PROCEDURE — 83036 HEMOGLOBIN GLYCOSYLATED A1C: CPT | Performed by: FAMILY MEDICINE

## 2021-03-04 NOTE — PROGRESS NOTES
SUBJECTIVE  Chief Complaint   Patient presents with    Other     IFG/prediabetes    Elevated Blood Pressure     Patient presents for follow up on their elevated blood pressure at his physical and prediabetes. We reviewed their cardiac risk factors. They have boosted exercise, now 5 days per week. The patient has been taking Lipitor as prescribed regularly and denies any side effects with the medication. The patient denies any chest pain or chest tightness. Denies any dyspnea upon exertion. He has really made an effort to cut out sugars. He has been consuming some high sodium foods 3 days per week. OBJECTIVE    Blood pressure (!) 140/86, pulse 69, temperature 98.3 °F (36.8 °C), temperature source Temporal, resp. rate 14, height 5' 9.5\" (1.765 m), weight 177 lb (80.3 kg), SpO2 99 %. General:  Alert, cooperative, well appearing, in no apparent distress. CV:  The heart sounds are regular in rate and rhythm. There is a normal S1 and S2. There or no murmurs. Lungs: Inspiratory and expiratory efforts are full and unlabored. Ext: no swelling. Psych: normal affect. Mood good. Oriented x 3. Judgement and insight intact. Results for orders placed or performed in visit on 03/04/21   AMB POC HEMOGLOBIN A1C   Result Value Ref Range    Hemoglobin A1c (POC) 5.6 %       ASSESSMENT / PLAN    ICD-10-CM ICD-9-CM    1. Elevated blood pressure reading  R03.0 796.2 CBC WITH AUTOMATED DIFF      METABOLIC PANEL, COMPREHENSIVE      EKG, 12 LEAD, INITIAL   2. Prediabetes  R73.03 790.29 AMB POC HEMOGLOBIN A1C      HEMOGLOBIN A1C WITH EAG   3. Hypercholesterolemia  E78.00 272.0 LIPID PANEL   4. Prostate cancer screening  Z12.5 V76.44 PSA SCREENING (SCREENING)     Elevated blood pressure-  Discussed medications but patient feels he can continue to make positive lifestyle changes. Provided DASH diet handout which may be biggest opportunity by cutting back on sodium intake. Cont regular exercise.   Recheck at physical.  If elevated, start antihypertensive agent. Repeat EKG prior to CPE. Prediabetes  - improving A1c to normal range now. Recheck in 6 months for stability. Hypercholesterolemia - cont Lipitor. Refills as needed. Annual fasting labs ordered. All chart history elements were reviewed by me at the time of the visit even though marked at time of note closure. Patient understands our medical plan. Patient has provided input and agrees with goals. Alternatives have been explained and offered. All questions answered. The patient is to call if condition worsens or fails to improve. Follow-up and Dispositions    · Return in about 6 months (around 9/4/2021) for physical.  Fasting labs due 3-7 days prior to appointment .

## 2021-03-04 NOTE — PATIENT INSTRUCTIONS
DASH Diet: Care Instructions Your Care Instructions The DASH diet is an eating plan that can help lower your blood pressure. DASH stands for Dietary Approaches to Stop Hypertension. Hypertension is high blood pressure. The DASH diet focuses on eating foods that are high in calcium, potassium, and magnesium. These nutrients can lower blood pressure. The foods that are highest in these nutrients are fruits, vegetables, low-fat dairy products, nuts, seeds, and legumes. But taking calcium, potassium, and magnesium supplements instead of eating foods that are high in those nutrients does not have the same effect. The DASH diet also includes whole grains, fish, and poultry. The DASH diet is one of several lifestyle changes your doctor may recommend to lower your high blood pressure. Your doctor may also want you to decrease the amount of sodium in your diet. Lowering sodium while following the DASH diet can lower blood pressure even further than just the DASH diet alone. Follow-up care is a key part of your treatment and safety. Be sure to make and go to all appointments, and call your doctor if you are having problems. It's also a good idea to know your test results and keep a list of the medicines you take. How can you care for yourself at home? Following the DASH diet · Eat 4 to 5 servings of fruit each day. A serving is 1 medium-sized piece of fruit, ½ cup chopped or canned fruit, 1/4 cup dried fruit, or 4 ounces (½ cup) of fruit juice. Choose fruit more often than fruit juice. · Eat 4 to 5 servings of vegetables each day. A serving is 1 cup of lettuce or raw leafy vegetables, ½ cup of chopped or cooked vegetables, or 4 ounces (½ cup) of vegetable juice. Choose vegetables more often than vegetable juice. · Get 2 to 3 servings of low-fat and fat-free dairy each day. A serving is 8 ounces of milk, 1 cup of yogurt, or 1 ½ ounces of cheese. · Eat 6 to 8 servings of grains each day. A serving is 1 slice of bread, 1 ounce of dry cereal, or ½ cup of cooked rice, pasta, or cooked cereal. Try to choose whole-grain products as much as possible. · Limit lean meat, poultry, and fish to 2 servings each day. A serving is 3 ounces, about the size of a deck of cards. · Eat 4 to 5 servings of nuts, seeds, and legumes (cooked dried beans, lentils, and split peas) each week. A serving is 1/3 cup of nuts, 2 tablespoons of seeds, or ½ cup of cooked beans or peas. · Limit fats and oils to 2 to 3 servings each day. A serving is 1 teaspoon of vegetable oil or 2 tablespoons of salad dressing. · Limit sweets and added sugars to 5 servings or less a week. A serving is 1 tablespoon jelly or jam, ½ cup sorbet, or 1 cup of lemonade. · Eat less than 2,300 milligrams (mg) of sodium a day. If you limit your sodium to 1,500 mg a day, you can lower your blood pressure even more. Tips for success · Start small. Do not try to make dramatic changes to your diet all at once. You might feel that you are missing out on your favorite foods and then be more likely to not follow the plan. Make small changes, and stick with them. Once those changes become habit, add a few more changes. · Try some of the following: ? Make it a goal to eat a fruit or vegetable at every meal and at snacks. This will make it easy to get the recommended amount of fruits and vegetables each day. ? Try yogurt topped with fruit and nuts for a snack or healthy dessert. ? Add lettuce, tomato, cucumber, and onion to sandwiches. ? Combine a ready-made pizza crust with low-fat mozzarella cheese and lots of vegetable toppings. Try using tomatoes, squash, spinach, broccoli, carrots, cauliflower, and onions. ? Have a variety of cut-up vegetables with a low-fat dip as an appetizer instead of chips and dip. ? Sprinkle sunflower seeds or chopped almonds over salads. Or try adding chopped walnuts or almonds to cooked vegetables. ? Try some vegetarian meals using beans and peas. Add garbanzo or kidney beans to salads. Make burritos and tacos with mashed parekh beans or black beans. Where can you learn more? Go to http://www.gray.com/ Enter S971 in the search box to learn more about \"DASH Diet: Care Instructions. \" Current as of: December 16, 2019               Content Version: 12.6 © 3698-6233 [employer]. Care instructions adapted under license by [employer] (which disclaims liability or warranty for this information). If you have questions about a medical condition or this instruction, always ask your healthcare professional. [address] any warranty or liability for your use of this information.

## 2021-03-04 NOTE — PROGRESS NOTES
1. Have you been to the ER, urgent care clinic since your last visit?  Hospitalized since your last visit? No    2. Have you seen or consulted any other health care providers outside of the [facility] since your last visit?  Include any pap smears or colon screening. No    Patient declines flu and TDAP vaccines.

## 2021-09-04 ENCOUNTER — HOSPITAL ENCOUNTER (OUTPATIENT)
Dept: LAB | Age: 56
Discharge: HOME | End: 2021-09-04
Payer: OTHER GOVERNMENT

## 2021-09-04 DIAGNOSIS — Z12.5 PROSTATE CANCER SCREENING: ICD-10-CM

## 2021-09-04 DIAGNOSIS — R73.03 PREDIABETES: ICD-10-CM

## 2021-09-04 DIAGNOSIS — R03.0 ELEVATED BLOOD PRESSURE READING: ICD-10-CM

## 2021-09-04 DIAGNOSIS — E78.00 HYPERCHOLESTEROLEMIA: ICD-10-CM

## 2021-09-04 LAB
ALBUMIN SERPL-MCNC: 4.2 G/DL (ref 3.4–5)
ALBUMIN/GLOB SERPL: 1.3 {RATIO} (ref 0.8–1.7)
ALP SERPL-CCNC: 83 U/L (ref 45–117)
ALT SERPL-CCNC: 22 U/L (ref 16–61)
ANION GAP SERPL CALC-SCNC: 4 MMOL/L (ref 3–18)
AST SERPL-CCNC: 14 U/L (ref 10–38)
BASOPHILS # BLD: 0 K/UL (ref 0–0.1)
BASOPHILS NFR BLD: 0 % (ref 0–2)
BILIRUB SERPL-MCNC: 0.8 MG/DL (ref 0.2–1)
BUN SERPL-MCNC: 12 MG/DL (ref 7–18)
BUN/CREAT SERPL: 12 (ref 12–20)
CALCIUM SERPL-MCNC: 9 MG/DL (ref 8.5–10.1)
CHLORIDE SERPL-SCNC: 108 MMOL/L (ref 100–111)
CHOLEST SERPL-MCNC: 143 MG/DL
CO2 SERPL-SCNC: 28 MMOL/L (ref 21–32)
CREAT SERPL-MCNC: 1 MG/DL (ref 0.6–1.3)
DIFFERENTIAL METHOD BLD: ABNORMAL
EOSINOPHIL # BLD: 0.1 K/UL (ref 0–0.4)
EOSINOPHIL NFR BLD: 2 % (ref 0–5)
ERYTHROCYTE [DISTWIDTH] IN BLOOD BY AUTOMATED COUNT: 13.2 % (ref 11.6–14.5)
EST. AVERAGE GLUCOSE BLD GHB EST-MCNC: 134 MG/DL
GLOBULIN SER CALC-MCNC: 3.3 G/DL (ref 2–4)
GLUCOSE SERPL-MCNC: 92 MG/DL (ref 74–99)
HBA1C MFR BLD: 6.3 % (ref 4.2–5.6)
HCT VFR BLD AUTO: 40.1 % (ref 36–48)
HDLC SERPL-MCNC: 54 MG/DL (ref 40–60)
HDLC SERPL: 2.6 {RATIO} (ref 0–5)
HGB BLD-MCNC: 13.2 G/DL (ref 13–16)
LDLC SERPL CALC-MCNC: 77 MG/DL (ref 0–100)
LIPID PROFILE,FLP: NORMAL
LYMPHOCYTES # BLD: 1.3 K/UL (ref 0.9–3.6)
LYMPHOCYTES NFR BLD: 30 % (ref 21–52)
MCH RBC QN AUTO: 28.6 PG (ref 24–34)
MCHC RBC AUTO-ENTMCNC: 32.9 G/DL (ref 31–37)
MCV RBC AUTO: 86.8 FL (ref 78–100)
MONOCYTES # BLD: 0.4 K/UL (ref 0.05–1.2)
MONOCYTES NFR BLD: 8 % (ref 3–10)
NEUTS SEG # BLD: 2.7 K/UL (ref 1.8–8)
NEUTS SEG NFR BLD: 60 % (ref 40–73)
PLATELET # BLD AUTO: 259 K/UL (ref 135–420)
PMV BLD AUTO: 9.7 FL (ref 9.2–11.8)
POTASSIUM SERPL-SCNC: 4 MMOL/L (ref 3.5–5.5)
PROT SERPL-MCNC: 7.5 G/DL (ref 6.4–8.2)
PSA SERPL-MCNC: 1.1 NG/ML (ref 0–4)
RBC # BLD AUTO: 4.62 M/UL (ref 4.35–5.65)
SODIUM SERPL-SCNC: 140 MMOL/L (ref 136–145)
TRIGL SERPL-MCNC: 60 MG/DL (ref ?–150)
VLDLC SERPL CALC-MCNC: 12 MG/DL
WBC # BLD AUTO: 4.5 K/UL (ref 4.6–13.2)

## 2021-09-04 PROCEDURE — 80053 COMPREHEN METABOLIC PANEL: CPT

## 2021-09-04 PROCEDURE — 84153 ASSAY OF PSA TOTAL: CPT

## 2021-09-04 PROCEDURE — 83036 HEMOGLOBIN GLYCOSYLATED A1C: CPT

## 2021-09-04 PROCEDURE — 93005 ELECTROCARDIOGRAM TRACING: CPT

## 2021-09-04 PROCEDURE — 80061 LIPID PANEL: CPT

## 2021-09-04 PROCEDURE — 36415 COLL VENOUS BLD VENIPUNCTURE: CPT

## 2021-09-04 PROCEDURE — 85025 COMPLETE CBC W/AUTO DIFF WBC: CPT

## 2021-09-05 LAB
ATRIAL RATE: 51 BPM
CALCULATED P AXIS, ECG09: 63 DEGREES
CALCULATED R AXIS, ECG10: 28 DEGREES
CALCULATED T AXIS, ECG11: 58 DEGREES
DIAGNOSIS, 93000: NORMAL
P-R INTERVAL, ECG05: 214 MS
Q-T INTERVAL, ECG07: 426 MS
QRS DURATION, ECG06: 88 MS
QTC CALCULATION (BEZET), ECG08: 392 MS
VENTRICULAR RATE, ECG03: 51 BPM

## 2021-09-07 ENCOUNTER — OFFICE VISIT (OUTPATIENT)
Dept: FAMILY MEDICINE CLINIC | Age: 56
End: 2021-09-07
Payer: OTHER GOVERNMENT

## 2021-09-07 VITALS
HEART RATE: 59 BPM | OXYGEN SATURATION: 96 % | HEIGHT: 70 IN | DIASTOLIC BLOOD PRESSURE: 88 MMHG | WEIGHT: 179 LBS | TEMPERATURE: 98.9 F | RESPIRATION RATE: 14 BRPM | SYSTOLIC BLOOD PRESSURE: 140 MMHG | BODY MASS INDEX: 25.62 KG/M2

## 2021-09-07 DIAGNOSIS — R00.1 BRADYCARDIA: ICD-10-CM

## 2021-09-07 DIAGNOSIS — Z23 ENCOUNTER FOR IMMUNIZATION: ICD-10-CM

## 2021-09-07 DIAGNOSIS — I44.0 1ST DEGREE AV BLOCK: ICD-10-CM

## 2021-09-07 DIAGNOSIS — R73.03 PREDIABETES: ICD-10-CM

## 2021-09-07 DIAGNOSIS — E78.00 HYPERCHOLESTEROLEMIA: ICD-10-CM

## 2021-09-07 DIAGNOSIS — R03.0 ELEVATED BLOOD PRESSURE READING: ICD-10-CM

## 2021-09-07 DIAGNOSIS — Z00.00 ROUTINE MEDICAL EXAM: Primary | ICD-10-CM

## 2021-09-07 PROCEDURE — 90715 TDAP VACCINE 7 YRS/> IM: CPT | Performed by: FAMILY MEDICINE

## 2021-09-07 PROCEDURE — 90471 IMMUNIZATION ADMIN: CPT | Performed by: FAMILY MEDICINE

## 2021-09-07 PROCEDURE — 99396 PREV VISIT EST AGE 40-64: CPT | Performed by: FAMILY MEDICINE

## 2021-09-07 RX ORDER — ATORVASTATIN CALCIUM 40 MG/1
40 TABLET, FILM COATED ORAL DAILY
Qty: 90 TABLET | Refills: 3 | Status: SHIPPED | OUTPATIENT
Start: 2021-09-07 | End: 2022-03-07 | Stop reason: SDUPTHER

## 2021-09-07 NOTE — PROGRESS NOTES
1. Have you been to the ER, urgent care clinic since your last visit? Hospitalized since your last visit? No    2. Have you seen or consulted any other health care providers outside of the [address] [place] [address] since your last visit? Include any pap smears or colon screening. No    Physician order obtained. Patient completed adult immunization consent form. Allergies, contraindications and recommendations reviewed with patient. TDAP vaccine administered IM left deltoid. Patient tolerated well. Patient remained in office for 15 minutes after injection and no adverse reactions were noted.

## 2021-09-07 NOTE — PROGRESS NOTES
Chief Complaint   Patient presents with    Physical    Results     Subjective:   Geraldo Preciado is a 50 y.o. male presenting for his annual checkup. ROS:  Complete 10 system ROS is obtained from the patient which is negative. Specific concerns today:     Has had BP elevations lately. Has bradycardia and 1st degree AV block on EKG. No chest pain or dyspnea on exertion. No lightheadedness or dizziness. He has let his diet worsen after improving his A1c so much last visit. Current Outpatient Medications   Medication Sig Dispense Refill    atorvastatin (Lipitor) 40 mg tablet Take 1 Tablet by mouth daily. 90 Tablet 3     No Known Allergies  Past Medical History:   Diagnosis Date    Achilles rupture, left 2017    Achilles rupture, right 2013    Body mass index (BMI) of 25.0 to 29.9     H/O colonoscopy 10/2018    Dr. Geno Elder, no polyps, 10 year follow-up    Hypercholesterolemia     Prediabetes 2020     Past Surgical History:   Procedure Laterality Date    HX ORTHOPAEDIC  5/2/2013    right achilles repair    HX VASECTOMY  2002    HX WISDOM TEETH EXTRACTION  1985     Family History   Problem Relation Age of Onset    Schizophrenia Mother      Social History     Tobacco Use    Smoking status: Never Smoker    Smokeless tobacco: Never Used   Substance Use Topics    Alcohol use: No      Objective:     Blood pressure (!) 140/88, pulse (!) 59, temperature 98.9 °F (37.2 °C), temperature source Temporal, resp. rate 14, height 5' 9.5\" (1.765 m), weight 179 lb (81.2 kg), SpO2 96 %. The patient appears well, alert, oriented x 3, in no distress. ENT normal.  Neck supple. No adenopathy or thyromegaly. RADHA. Lungs are clear, good air entry, no wheezes, rhonchi or rales. S1 and S2 normal, no murmurs, regular rate and rhythm. Abdomen is soft without tenderness, guarding, mass or organomegaly. Extremities show no edema. Neurological is normal without focal findings. Psych: normal affect.   Mood good.  Oriented x 3. Judgement and insight intact. Results for orders placed or performed during the hospital encounter of 09/04/21   EKG, 12 LEAD, INITIAL   Result Value Ref Range    Ventricular Rate 51 BPM    Atrial Rate 51 BPM    P-R Interval 214 ms    QRS Duration 88 ms    Q-T Interval 426 ms    QTC Calculation (Bezet) 392 ms    Calculated P Axis 63 degrees    Calculated R Axis 28 degrees    Calculated T Axis 58 degrees    Diagnosis       Sinus bradycardia with marked sinus arrhythmia with 1st degree AV block with   pac  Otherwise normal ECG  When compared with ECG of 19-JUN-2017 14:56,  No significant change was found  Confirmed by Debbie Galvez ([phone]) on 9/4/2021 12:35:05 PM  Also confirmed by Debbie Galvez ([id number]),  Mandie Stephens ([id number])  on   9/5/2021 9:18:32 AM       Assessment/Plan:       ICD-10-CM ICD-9-CM    1. Routine medical exam  Z00.00 V70.0    2. Prediabetes  R73.03 790.29    3. Hypercholesterolemia  E78.00 272.0 atorvastatin (Lipitor) 40 mg tablet   4. Bradycardia  R00.1 427.89 REFERRAL TO CARDIOLOGY   5. Elevated blood pressure reading  R03.0 796.2 REFERRAL TO CARDIOLOGY   6. 1st degree AV block  I44.0 426.11 REFERRAL TO CARDIOLOGY   7. Encounter for immunization  Z23 V03.89 [place] IMMUNIZ ADMIN,1 SINGLE/COMB VAC/TOXOID      TETANUS, DIPHTHERIA TOXOIDS AND ACELLULAR PERTUSSIS VACCINE (TDAP), IN INDIVIDS. >=7, IM     Age and sex specific counseling. Pt ed handouts. Diet and exercise due to BMI>25. Fasting labs reviewed. Refills of lipitor. Has been advised on shingles vaccine at prior visits. Agrees to TDap today. Due to bradycardia, 1st degree AV block and mild elevated BP, I will refer him to cardiology. All chart history elements were reviewed by me at the time of the visit even though marked at time of note closure. Patient understands our medical plan. Patient has provided input and agrees with goals. Alternatives have been explained and offered. All questions answered. The patient is to call if condition worsens or fails to improve. Follow-up and Dispositions    · Return in about 6 months (around 3/7/2022). A1c in office.

## 2021-09-07 NOTE — PATIENT INSTRUCTIONS
Well Visit, Men 48 to 72: Care Instructions  Overview     Well visits can help you stay healthy. Your doctor has checked your overall health and may have suggested ways to take good care of yourself. Your doctor also may have recommended tests. At home, you can help prevent illness with healthy eating, regular exercise, and other steps. Follow-up care is a key part of your treatment and safety. Be sure to make and go to all appointments, and call your doctor if you are having problems. It's also a good idea to know your test results and keep a list of the medicines you take. How can you care for yourself at home? · Get screening tests that you and your doctor decide on. Screening helps find diseases before any symptoms appear. · Eat healthy foods. Choose fruits, vegetables, whole grains, protein, and low-fat dairy foods. Limit fat, especially saturated fat. Reduce salt in your diet. · Limit alcohol. Have no more than 2 drinks a day or 14 drinks a week. · Get at least 30 minutes of exercise on most days of the week. Walking is a good choice. You also may want to do other activities, such as running, swimming, cycling, or playing tennis or team sports. · Reach and stay at a healthy weight. This will lower your risk for many problems, such as obesity, diabetes, heart disease, and high blood pressure. · Do not smoke. Smoking can make health problems worse. If you need help quitting, talk to your doctor about stop-smoking programs and medicines. These can increase your chances of quitting for good. · Care for your mental health. It is easy to get weighed down by worry and stress. Learn strategies to manage stress, like deep breathing and mindfulness, and stay connected with your family and community. If you find you often feel sad or hopeless, talk with your doctor. Treatment can help. · Talk to your doctor about whether you have any risk factors for sexually transmitted infections (STIs).  You can help prevent STIs if you wait to have sex with a new partner (or partners) until you've each been tested for STIs. It also helps if you use condoms (male or female condoms) and if you limit your sex partners to one person who only has sex with you. Vaccines are available for some STIs. · If it's important to you to prevent pregnancy with your partner, talk with your doctor about birth control options that might be best for you. · If you think you may have a problem with alcohol or drug use, talk to your doctor. This includes prescription medicines (such as amphetamines and opioids) and illegal drugs (such as cocaine and methamphetamine). Your doctor can help you figure out what type of treatment is best for you. · Protect your skin from too much sun. When you're outdoors from 10 a.m. to 4 p.m., stay in the shade or cover up with clothing and a hat with a wide brim. Wear sunglasses that block UV rays. Even when it's cloudy, put broad-spectrum sunscreen (SPF 30 or higher) on any exposed skin. · See a dentist one or two times a year for checkups and to have your teeth cleaned. · Wear a seat belt in the car. When should you call for help? Watch closely for changes in your health, and be sure to contact your doctor if you have any problems or symptoms that concern you. Where can you learn more? Go to http://www.gray.com/  Enter M594 in the search box to learn more about \"Well Visit, Men 48 to 72: Care Instructions. \"  Current as of: May 27, 2020               Content Version: 12.8  © 2006-2021 [employer]. Care instructions adapted under license by [employer] (which disclaims liability or warranty for this information). If you have questions about a medical condition or this instruction, always ask your healthcare professional. [place] [address] any warranty or liability for your use of this information.          Heart Blocks: Care Instructions  Your Care Instructions     A heart block is a problem with your heart's electrical system. Normally, a small area of the heart (sinus node) creates the electrical signals that cause the heart to beat in a timed and regular way. A heart block occurs when the signal is blocked. This disrupts the heartbeat. A heart block does not mean that blood flow to the heart is blocked. Heart block can be caused by many things that affect the electrical system of the heart. These things include the effects of aging, certain medicines, and another health condition. There are three types of heart blocks. In a first-degree heart block, the signal is slower than normal. But the heart rate is normal, and the heart usually is not damaged. This is what you have. In a second-degree heart block, some signals do not reach the lower chambers of the heart. This can cause the heart to skip a beat or have an abnormal rhythm. In a third-degree heart block, the signal is completely blocked from reaching the lower chambers. This can cause the heart to slow down a lot or even stop beating. It is a very serious condition. How heart block is treated can depend on the type and what is causing it. Treatment can also depend on your symptoms. If heart block doesn't cause symptoms, it may not be treated. Treatment may be a pacemaker. You and your doctor can decide what treatment is right for you. Follow-up care is a key part of your treatment and safety. Be sure to make and go to all appointments, and call your doctor if you are having problems. It's also a good idea to know your test results and keep a list of the medicines you take. How can you care for yourself at home? · If you feel lightheaded, sit or lie down to avoid injury that might occur if you faint and fall. · Make lifestyle changes to improve your heart health. ? Eat a heart-healthy diet that includes vegetables, fruits, nuts, beans, lean meat, fish, and whole grains.  Limit alcohol, sodium, and sugar. ? Get regular exercise. Try for 30 minutes on most days of the week. If you do not have other heart problems, you likely do not have limits on the type or level of activity that you can do. You may want to walk, swim, bike, or do other activities. Ask your doctor what level of exercise is safe for you.  ? Stay at a healthy weight. Lose weight if you need to.  ? Do not smoke. If you need help quitting, talk to your doctor about stop-smoking programs and medicines. These can increase your chances of quitting for good. ? Manage other health problems such as high blood pressure, high cholesterol, and diabetes. · If you received a pacemaker or an implantable cardioverter-defibrillator (ICD), you will get more information about it. · Wear medical alert jewelry that describes your condition and says you have a pacemaker or ICD. You can buy this at most drugstores. When should you call for help? Call 911 anytime you think you may need emergency care. For example, call if:    · You passed out (lost consciousness).     · You have symptoms of a heart attack. These may include:  ? Chest pain or pressure, or a strange feeling in the chest.  ? Sweating. ? Shortness of breath. ? Nausea or vomiting. ? Pain, pressure, or a strange feeling in the back, neck, jaw, or upper belly or in one or both shoulders or arms. ? Lightheadedness or sudden weakness. ? A fast or irregular heartbeat. After you call 911, the  may tell you to chew 1 adult-strength or 2 to 4 low-dose aspirin. Wait for an ambulance. Do not try to drive yourself. Call your doctor now or seek immediate medical care if:    · You are dizzy or lightheaded, or you feel like you may faint.     · You have new or increased shortness of breath. Watch closely for changes in your health, and be sure to contact your doctor if you have any problems. Where can you learn more?   Go to http://www.gray.com/  Enter Z024 in the search box to learn more about \"Heart Blocks: Care Instructions. \"  Current as of: August 31, 2020               Content Version: 12.8  © 2006-2021 [employer]. Care instructions adapted under license by [employer] (which disclaims liability or warranty for this information). If you have questions about a medical condition or this instruction, always ask your healthcare professional. [address] any warranty or liability for your use of this information. Prediabetes: Care Instructions  Overview     Prediabetes is a warning sign that you're at risk for getting type 2 diabetes. It means that your blood sugar is higher than it should be. But it's not high enough to be diabetes. The food you eat naturally turns into sugar. Your body uses the sugar for energy. Normally, an organ called the pancreas makes insulin. And insulin allows the sugar in your blood to get into your body's cells. But sometimes the body can't use insulin the right way. So the sugar stays in your blood instead. This is called insulin resistance. The buildup of sugar in your blood means you have prediabetes. The good news is that you may be able to prevent or delay diabetes. Making small lifestyle changes, like getting active and changing your eating habits, may help you get your blood sugar back to normal. You can work with your doctor to make a treatment plan. Follow-up care is a key part of your treatment and safety. Be sure to make and go to all appointments, and call your doctor if you are having problems. It's also a good idea to know your test results and keep a list of the medicines you take. How can you care for yourself at home? · Watch your weight. A healthy weight helps your body use insulin properly. · Limit the amount of calories, sweets, and unhealthy fat you eat. Ask your doctor if you should see a dietitian.  A registered dietitian can help you create meal plans that fit your lifestyle. · Get at least 30 minutes of exercise on most days of the week. Exercise helps control your blood sugar. It also helps you maintain a healthy weight. Walking is a good choice. You also may want to do other activities, such as running, swimming, cycling, or playing tennis or team sports. · Do not smoke. Smoking can make prediabetes worse. If you need help quitting, talk to your doctor about stop-smoking programs and medicines. These can increase your chances of quitting for good. · If your doctor prescribed medicines, take them exactly as prescribed. Call your doctor if you think you are having a problem with your medicine. You will get more details on the specific medicines your doctor prescribes. When should you call for help? Watch closely for changes in your health, and be sure to contact your doctor if:    · You have any symptoms of diabetes. These may include:  ? Being thirsty more often. ? Urinating more. ? Being hungrier. ? Losing weight. ? Being very tired. ? Having blurry vision.     · You have a wound that will not heal.     · You have an infection that will not go away.     · You have problems with your blood pressure.     · You want more information about diabetes and how you can keep from getting it. Where can you learn more? Go to http://www.gray.com/  Enter I222 in the search box to learn more about \"Prediabetes: Care Instructions. \"  Current as of: August 31, 2020               Content Version: 12.8  © 7605-6560 [employer]. Care instructions adapted under license by [employer] (which disclaims liability or warranty for this information). If you have questions about a medical condition or this instruction, always ask your healthcare professional. [place] [address] any warranty or liability for your use of this information.          Tdap (Tetanus, Diphtheria, Pertussis) Vaccine: What You Need to Know  Why get vaccinated? Tdap vaccine can prevent tetanus, diphtheria, and pertussis. Diphtheria and pertussis spread from person to person. Tetanus enters the body through cuts or wounds. · TETANUS (T) causes painful stiffening of the muscles. Tetanus can lead to serious health problems, including being unable to open the mouth, having trouble swallowing and breathing, or death. · DIPHTHERIA (D) can lead to difficulty breathing, heart failure, paralysis, or death. · PERTUSSIS (aP), also known as \"whooping cough,\" can cause uncontrollable, violent coughing which makes it hard to breathe, eat, or drink. Pertussis can be extremely serious in babies and young children, causing pneumonia, convulsions, brain damage, or death. In teens and adults, it can cause weight loss, loss of bladder control, passing out, and rib fractures from severe coughing. Tdap vaccine  Tdap is only for children 7 years and older, adolescents, and adults. Adolescents should receive a single dose of Tdap, preferably at age 6 or 15 years. Pregnant women should get a dose of Tdap during every pregnancy, to protect the  from pertussis. Infants are most at risk for severe, life threatening complications from pertussis. Adults who have never received Tdap should get a dose of Tdap. Also, adults should receive a booster dose every 10 years, or earlier in the case of a severe and dirty wound or burn. Booster doses can be either Tdap or Td (a different vaccine that protects against tetanus and diphtheria but not pertussis). Tdap may be given at the same time as other vaccines. Talk with your health care provider  Tell your vaccine provider if the person getting the vaccine:  · Has had an allergic reaction after a previous dose of any vaccine that protects against tetanus, diphtheria, or pertussis, or has any severe, life threatening allergies.   · Has had a coma, decreased level of consciousness, or prolonged seizures within 7 days after a previous dose of any pertussis vaccine (DTP, DTaP, or Tdap). · Has seizures or another nervous system problem. · Has ever had Guillain-Barré Syndrome (also called GBS). · Has had severe pain or swelling after a previous dose of any vaccine that protects against tetanus or diphtheria. In some cases, your health care provider may decide to postpone Tdap vaccination to a future visit. People with minor illnesses, such as a cold, may be vaccinated. People who are moderately or severely ill should usually wait until they recover before getting Tdap vaccine. Your health care provider can give you more information. Risks of a vaccine reaction  · Pain, redness, or swelling where the shot was given, mild fever, headache, feeling tired, and nausea, vomiting, diarrhea, or stomachache sometimes happen after Tdap vaccine. People sometimes faint after medical procedures, including vaccination. Tell your provider if you feel dizzy or have vision changes or ringing in the ears. As with any medicine, there is a very remote chance of a vaccine causing a severe allergic reaction, other serious injury, or death. What if there is a serious problem? An allergic reaction could occur after the vaccinated person leaves the clinic. If you see signs of a severe allergic reaction (hives, swelling of the face and throat, difficulty breathing, a fast heartbeat, dizziness, or weakness), call 9-1-1 and get the person to the nearest hospital.  For other signs that concern you, call your health care provider. Adverse reactions should be reported to the Vaccine Adverse Event Reporting System (VAERS). Your health care provider will usually file this report, or you can do it yourself. Visit the VAERS website at www.vaers. hhs.gov or call 5-280.352.4273. VAERS is only for reporting reactions, and VAERS staff do not give medical advice.   The [place] Vaccine Injury Compensation Program  The [place] Vaccine Injury Compensation Program (VICP) is a federal program that was created to compensate people who may have been injured by certain vaccines. Visit the VICP website at www.hrsa.gov/vaccinecompensation or call 9-444.893.3099 to learn about the program and about filing a claim. There is a time limit to file a claim for compensation. How can I learn more? · Ask your health care provider. · Call your local or state health department. · Contact the Centers for Disease Control and Prevention (CDC):  ? Call 1-676.693.3391 (2-132-YMD-INFO) or  ? Visit CDC's website at www.cdc.gov/vaccines  Vaccine Information Statement (Interim)  Tdap (Tetanus, Diphtheria, Pertussis) Vaccine  04/01/2020  44 KARINE Morris [id number]  Department of Health and Human Services  Centers for Disease Control and Prevention  Many Vaccine Information Statements are available in [place] and other languages. See www.immunize.org/vis. Muchas hojas de información sobre vacunas están disponibles en español y en otros idiomas. Visite www.immunize.org/vis. Care instructions adapted under license by [employer] (which disclaims liability or warranty for this information). If you have questions about a medical condition or this instruction, always ask your healthcare professional. [address] any warranty or liability for your use of this information.

## 2021-10-18 ENCOUNTER — OFFICE VISIT (OUTPATIENT)
Dept: CARDIOLOGY CLINIC | Age: 56
End: 2021-10-18
Payer: OTHER GOVERNMENT

## 2021-10-18 VITALS
HEIGHT: 69 IN | DIASTOLIC BLOOD PRESSURE: 105 MMHG | BODY MASS INDEX: 26.1 KG/M2 | WEIGHT: 176.2 LBS | HEART RATE: 61 BPM | SYSTOLIC BLOOD PRESSURE: 177 MMHG

## 2021-10-18 DIAGNOSIS — E78.00 HYPERCHOLESTEROLEMIA: ICD-10-CM

## 2021-10-18 DIAGNOSIS — R00.1 BRADYCARDIA: Primary | ICD-10-CM

## 2021-10-18 DIAGNOSIS — R73.09 ELEVATED HEMOGLOBIN A1C: ICD-10-CM

## 2021-10-18 DIAGNOSIS — R03.0 ELEVATED BLOOD PRESSURE READING: ICD-10-CM

## 2021-10-18 PROCEDURE — 99204 OFFICE O/P NEW MOD 45 MIN: CPT | Performed by: INTERNAL MEDICINE

## 2021-10-18 RX ORDER — AMLODIPINE BESYLATE 5 MG/1
5 TABLET ORAL DAILY
Qty: 90 TABLET | Refills: 1 | Status: SHIPPED | OUTPATIENT
Start: 2021-10-18 | End: 2022-03-07 | Stop reason: SDUPTHER

## 2021-10-18 NOTE — PROGRESS NOTES
HISTORY OF PRESENT ILLNESS  Anne Wilson is a 50 y.o. male. 10/18/2021  Patient is referred here for new patient evaluation. He is referred here for bradycardia. Patient has history of elevated blood pressure and elevated hemoglobin A1c. He is to have hyperlipidemia on treatment. Was noted to have abnormal EKG. He denies any chest pain, shortness of breath dizziness syncope or any other symptom. Review of Systems   Constitutional: Negative for chills and fever. HENT: Negative for nosebleeds. Eyes: Negative for blurred vision and double vision. Respiratory: Negative for cough, hemoptysis, sputum production, shortness of breath and wheezing. Cardiovascular: Negative for chest pain, palpitations, orthopnea, claudication, leg swelling and PND. Gastrointestinal: Negative for abdominal pain, heartburn, nausea and vomiting. Musculoskeletal: Negative for myalgias. Skin: Negative for rash. Neurological: Negative for dizziness, weakness and headaches. Endo/Heme/Allergies: Does not bruise/bleed easily. Family History   Problem Relation Age of Onset    Schizophrenia Mother        Past Medical History:   Diagnosis Date    Achilles rupture, left 2017    Achilles rupture, right 2013    Body mass index (BMI) of 25.0 to 29.9     H/O colonoscopy 10/2018    Dr. Jo Ann Rojas, no polyps, 10 year follow-up    Hypercholesterolemia     Prediabetes 2020       Past Surgical History:   Procedure Laterality Date    HX ORTHOPAEDIC  5/2/2013    right achilles repair    HX VASECTOMY  2002    HX WISDOM TEETH EXTRACTION  1985       Social History     Tobacco Use    Smoking status: Never Smoker    Smokeless tobacco: Never Used   Substance Use Topics    Alcohol use: No       No Known Allergies    Prior to Admission medications    Medication Sig Start Date End Date Taking? Authorizing Provider   amLODIPine (NORVASC) 5 mg tablet Take 1 Tablet by mouth daily.  10/18/21  Yes Cee Calix MD atorvastatin (Lipitor) 40 mg tablet Take 1 Tablet by mouth daily. 9/7/21  Yes Jean Dean MD         Visit Vitals  BP (!) 177/105   Pulse 61   Ht 5' 9\" (1.753 m)   Wt 79.9 kg (176 lb 3.2 oz)   BMI 26.02 kg/m²         Physical Exam  Constitutional:       Appearance: He is well-developed. HENT:      Head: Normocephalic and atraumatic. Eyes:      Conjunctiva/sclera: Conjunctivae normal.   Neck:      Thyroid: No thyromegaly. Vascular: No JVD. Trachea: No tracheal deviation. Cardiovascular:      Rate and Rhythm: Normal rate and regular rhythm. Heart sounds: Normal heart sounds. No murmur heard. No friction rub. No gallop. Pulmonary:      Effort: No respiratory distress. Breath sounds: Normal breath sounds. No wheezing or rales. Chest:      Chest wall: No tenderness. Abdominal:      Palpations: Abdomen is soft. Tenderness: There is no abdominal tenderness. Musculoskeletal:      Cervical back: Neck supple. Skin:     General: Skin is warm and dry. Neurological:      Mental Status: He is alert and oriented to person, place, and time. Mr. Dani Givens has a reminder for a \"due or due soon\" health maintenance. I have asked that he contact his primary care provider for follow-up on this health maintenance. No flowsheet data found. I have personally reviewed patient's records available from hospital and other providers and incorporated findings in patient care. Provider note, lab, EKG  I have personally reviewed patients ekg done at other facility. 9/4/2021sinus rhythm, PAC    Assessment         ICD-10-CM ICD-9-CM    1. Bradycardia  R00.1 427.89     Asymptomatic sinus arrhythmia and PAC. Will monitor   2. Hypercholesterolemia  E78.00 272.0 CT HEART W/O CONT WITH CALCIUM    Continue statin check coronary calcium score and decide on intensity   3.  Elevated blood pressure reading  R03.0 796.2 CT HEART W/O CONT WITH CALCIUM      [facility] MYCHART BP FLOWSHEET    Persistent elevation I have added amlodipine will monitor   4. Elevated hemoglobin A1c  R73.09 790.29 CT HEART W/O CONT WITH CALCIUM    Continue dietary modification. Follow-up with PCP     10/18/2021  Seen for new patient evaluation for abnormal EKG multiple risk factor. Add amlodipine for hypertension and set up for coronary calcium score  There are no discontinued medications. Orders Placed This Encounter    CT HEART W/O CONT WITH CALCIUM     Standing Status:   Future     Standing Expiration Date:   11/18/2022    [facility] BP FLOWSHEET     Order Specific Question:   After how many readings would you like to receive a notification of this patient's entries? Answer:   7    amLODIPine (NORVASC) 5 mg tablet     Sig: Take 1 Tablet by mouth daily. Dispense:  90 Tablet     Refill:  1       Follow-up and Dispositions    · Return for F/u after tests.

## 2021-10-18 NOTE — PATIENT INSTRUCTIONS
Amlodipine (By mouth)   Amlodipine ([id number])  Treats high blood pressure and angina (chest pain). This medicine is a calcium channel blocker. Brand Name(s): Norvasc   There may be other brand names for this medicine. When This Medicine Should Not Be Used: This medicine is not right for everyone. Do not use it if you had an allergic reaction to amlodipine. How to Use This Medicine:   Tablet, Dissolving Tablet  · Take your medicine as directed. Your dose may need to be changed several times to find what works best for you. Take this medicine at the same time each day. · Read and follow the patient instructions that come with this medicine. Talk to your doctor or pharmacist if you have any questions. · Missed dose: Take a dose as soon as you remember. If it has been more than 12 hours since you were supposed to take your dose, skip the missed dose and take your next regular dose at the regular time. · Store the medicine in a closed container at room temperature, away from heat, moisture, and direct light. Drugs and Foods to Avoid:   Ask your doctor or pharmacist before using any other medicine, including over-the-counter medicines, vitamins, and herbal products. · Some medicines can affect how amlodipine works. Tell your doctor if you are also using any of the following:   ¨ Clarithromycin, cyclosporine, diltiazem, itraconazole, ritonavir, sildenafil, simvastatin, tacrolimus  Warnings While Using This Medicine:   · Tell your doctor if you are pregnant or breastfeeding, or if you have liver disease, heart disease, coronary artery disease, or aortic stenosis. · This medicine could lower your blood pressure too much, especially when you first use it or if you are dehydrated. Stand or sit up slowly if you feel lightheaded or dizzy. · Your doctor will check your progress and the effects of this medicine at regular visits. Keep all appointments.   · Do not stop using this medicine without asking your doctor, even if you feel well. This medicine will not cure high blood pressure, but it will help keep it in normal range. You may have to take blood pressure medicine for the rest of your life. · Keep all medicine out of the reach of children. Never share your medicine with anyone. Possible Side Effects While Using This Medicine:   Call your doctor right away if you notice any of these side effects:  · Allergic reaction: Itching or hives, swelling in your face or hands, swelling or tingling in your mouth or throat, chest tightness, trouble breathing  · Lightheadedness, dizziness  · New or worsening chest pain  · Swelling in your hands, ankles, or legs  · Trouble breathing, nausea, unusual sweating, fainting  If you notice other side effects that you think are caused by this medicine, tell your doctor. Call your doctor for medical advice about side effects. You may report side effects to FDA at 8-114-FDA-0626  © 2017 [facility] Information is for End User's use only and may not be sold, redistributed or otherwise used for commercial purposes. The above information is an  only. It is not intended as medical advice for individual conditions or treatments. Talk to your doctor, nurse or pharmacist before following any medical regimen to see if it is safe and effective for you.

## 2021-10-18 NOTE — PROGRESS NOTES
1. Have you been to the ER, urgent care clinic since your last visit? Hospitalized since your last visit? No    2. Have you seen or consulted any other health care providers outside of the [address] [place] [address] since your last visit? Include any pap smears or colon screening.       No

## 2021-10-26 ENCOUNTER — HOSPITAL ENCOUNTER (OUTPATIENT)
Dept: CT IMAGING | Age: 56
Discharge: HOME | End: 2021-10-26
Attending: INTERNAL MEDICINE
Payer: SELF-PAY

## 2021-10-26 DIAGNOSIS — R03.0 ELEVATED BLOOD PRESSURE READING: ICD-10-CM

## 2021-10-26 DIAGNOSIS — R73.09 ELEVATED HEMOGLOBIN A1C: ICD-10-CM

## 2021-10-26 DIAGNOSIS — E78.00 HYPERCHOLESTEROLEMIA: ICD-10-CM

## 2021-10-26 PROCEDURE — 75571 CT HRT W/O DYE W/CA TEST: CPT

## 2021-10-27 ENCOUNTER — TELEPHONE (OUTPATIENT)
Dept: CARDIOLOGY CLINIC | Age: 56
End: 2021-10-27

## 2021-10-27 NOTE — TELEPHONE ENCOUNTER
Spoke with patient regarding lab/test per Dr. Abby Mendez. Test reviewed Coronary calcium score is 0. Continue treatment for blood pressure otherwise normal. He voices understanding and acceptance of this advice and will call back if any further questions or concerns.

## 2021-10-27 NOTE — TELEPHONE ENCOUNTER
----- Message from Enmanuel Ramires MD sent at 10/27/2021  8:28 AM EDT -----  Coronary calcium score is 0.   Continue treatment for blood pressure otherwise monitor

## 2021-11-30 ENCOUNTER — OFFICE VISIT (OUTPATIENT)
Dept: CARDIOLOGY CLINIC | Age: 56
End: 2021-11-30
Payer: OTHER GOVERNMENT

## 2021-11-30 VITALS
HEIGHT: 69 IN | DIASTOLIC BLOOD PRESSURE: 88 MMHG | HEART RATE: 54 BPM | WEIGHT: 173 LBS | BODY MASS INDEX: 25.62 KG/M2 | SYSTOLIC BLOOD PRESSURE: 141 MMHG

## 2021-11-30 DIAGNOSIS — E78.00 HYPERCHOLESTEROLEMIA: ICD-10-CM

## 2021-11-30 DIAGNOSIS — R73.09 ELEVATED HEMOGLOBIN A1C: ICD-10-CM

## 2021-11-30 DIAGNOSIS — R00.1 BRADYCARDIA: ICD-10-CM

## 2021-11-30 DIAGNOSIS — I10 PRIMARY HYPERTENSION: Primary | ICD-10-CM

## 2021-11-30 PROCEDURE — 99214 OFFICE O/P EST MOD 30 MIN: CPT | Performed by: INTERNAL MEDICINE

## 2021-11-30 NOTE — PROGRESS NOTES
1. Have you been to the ER, urgent care clinic since your last visit? Hospitalized since your last visit? No    2. Have you seen or consulted any other health care providers outside of the [address] [place] [address] since your last visit? Include any pap smears or colon screening.       No

## 2021-11-30 NOTE — PROGRESS NOTES
HISTORY OF PRESENT ILLNESS  Cedric Beebe is a 50 y.o. male. 10/18/2021  Patient is referred here for new patient evaluation. He is referred here for bradycardia. Patient has history of elevated blood pressure and elevated hemoglobin A1c. He is to have hyperlipidemia on treatment. Was noted to have abnormal EKG. He denies any chest pain, shortness of breath dizziness syncope or any other symptom. Follow-up  Pertinent negatives include no chest pain, no abdominal pain, no headaches and no shortness of breath. Review of Systems   Constitutional: Negative for chills and fever. HENT: Negative for nosebleeds. Eyes: Negative for blurred vision and double vision. Respiratory: Negative for cough, hemoptysis, sputum production, shortness of breath and wheezing. Cardiovascular: Negative for chest pain, palpitations, orthopnea, claudication, leg swelling and PND. Gastrointestinal: Negative for abdominal pain, heartburn, nausea and vomiting. Musculoskeletal: Negative for myalgias. Skin: Negative for rash. Neurological: Negative for dizziness, weakness and headaches. Endo/Heme/Allergies: Does not bruise/bleed easily. Family History   Problem Relation Age of Onset    Schizophrenia Mother        Past Medical History:   Diagnosis Date    Achilles rupture, left 2017    Achilles rupture, right 2013    Body mass index (BMI) of 25.0 to 29.9     H/O colonoscopy 10/2018    Dr. Lethea Baumgarten, no polyps, 10 year follow-up    Hypercholesterolemia     Prediabetes 2020       Past Surgical History:   Procedure Laterality Date    HX ORTHOPAEDIC  5/2/2013    right achilles repair    HX VASECTOMY  2002    HX WISDOM TEETH EXTRACTION  1985       Social History     Tobacco Use    Smoking status: Never Smoker    Smokeless tobacco: Never Used   Substance Use Topics    Alcohol use: No       No Known Allergies    Prior to Admission medications    Medication Sig Start Date End Date Taking?  Authorizing Provider   amLODIPine (NORVASC) 5 mg tablet Take 1 Tablet by mouth daily. 10/18/21  Yes Franklin Boykin MD   atorvastatin (Lipitor) 40 mg tablet Take 1 Tablet by mouth daily. 9/7/21  Yes Sherita Simpson MD         Visit Vitals  BP (!) 141/88   Pulse (!) 54   Ht 5' 9\" (1.753 m)   Wt 78.5 kg (173 lb)   BMI 25.55 kg/m²         Physical Exam  Constitutional:       Appearance: He is well-developed. HENT:      Head: Normocephalic and atraumatic. Eyes:      Conjunctiva/sclera: Conjunctivae normal.   Neck:      Thyroid: No thyromegaly. Vascular: No JVD. Trachea: No tracheal deviation. Cardiovascular:      Rate and Rhythm: Normal rate and regular rhythm. Heart sounds: Normal heart sounds. No murmur heard. No friction rub. No gallop. Pulmonary:      Effort: No respiratory distress. Breath sounds: Normal breath sounds. No wheezing or rales. Chest:      Chest wall: No tenderness. Abdominal:      Palpations: Abdomen is soft. Tenderness: There is no abdominal tenderness. Musculoskeletal:      Cervical back: Neck supple. Skin:     General: Skin is warm and dry. Neurological:      Mental Status: He is alert and oriented to person, place, and time. Mr. Tommie Millan has a reminder for a \"due or due soon\" health maintenance. I have asked that he contact his primary care provider for follow-up on this health maintenance. No flowsheet data found. I have personally reviewed patient's records available from hospital and other providers and incorporated findings in patient care. Provider note, lab, EKG  I have personally reviewed patients ekg done at other facility. 9/4/2021sinus rhythm, PAC  10/2021 CTA  CARDIOLOGY REPORT:       The patient underwent a limited noncontrast CT scan of the chest with cardiac  gating to evaluate for coronary arterial calcification. Using the Agatston  method the coronary calcium score was calculated. There is no coronary calcium  present .  Coronary calcium score calculated at  0.     IMPRESSION:  Coronary calcium score 0. I Have personally reviewed recent relevant labs available and discussed with patient  CBC, LDL, BMP, hemoglobin A1c  Assessment         ICD-10-CM ICD-9-CM    1. Primary hypertension  I10 401.9     Mildly elevated here home readings are normal.   2. Hypercholesterolemia  E78.00 272.0     LDL is in 70s continue current medical management monitor continue dietary modification   3. Bradycardia  R00.1 427.89     Stable asymptomatic monitor   4. Elevated hemoglobin A1c  R73.09 790.29     Continue diet modification follow-up with PCP     10/18/2021  Seen for new patient evaluation for abnormal EKG multiple risk factor. Add amlodipine for hypertension and set up for coronary calcium score  11/2021  Cardiac status stable. Calcium score 0. Blood pressure improving significantly home readings are normal. Continue current medical management lab reviewed. Continue dietary modification for high hemoglobin A1c        There are no discontinued medications. No orders of the defined types were placed in this encounter. Follow-up and Dispositions    · Return if symptoms worsen or fail to improve.

## 2022-03-07 ENCOUNTER — OFFICE VISIT (OUTPATIENT)
Dept: FAMILY MEDICINE CLINIC | Age: 57
End: 2022-03-07
Payer: OTHER GOVERNMENT

## 2022-03-07 VITALS
SYSTOLIC BLOOD PRESSURE: 116 MMHG | DIASTOLIC BLOOD PRESSURE: 82 MMHG | OXYGEN SATURATION: 100 % | WEIGHT: 172 LBS | RESPIRATION RATE: 14 BRPM | HEIGHT: 69 IN | BODY MASS INDEX: 25.48 KG/M2 | HEART RATE: 66 BPM | TEMPERATURE: 98.6 F

## 2022-03-07 DIAGNOSIS — Z12.5 PROSTATE CANCER SCREENING: ICD-10-CM

## 2022-03-07 DIAGNOSIS — I10 HYPERTENSION, UNSPECIFIED TYPE: Primary | ICD-10-CM

## 2022-03-07 DIAGNOSIS — E78.00 HYPERCHOLESTEROLEMIA: ICD-10-CM

## 2022-03-07 DIAGNOSIS — R73.03 PREDIABETES: ICD-10-CM

## 2022-03-07 LAB — HBA1C MFR BLD HPLC: 6 %

## 2022-03-07 PROCEDURE — 99214 OFFICE O/P EST MOD 30 MIN: CPT | Performed by: FAMILY MEDICINE

## 2022-03-07 PROCEDURE — 83036 HEMOGLOBIN GLYCOSYLATED A1C: CPT | Performed by: FAMILY MEDICINE

## 2022-03-07 RX ORDER — AMLODIPINE BESYLATE 5 MG/1
5 TABLET ORAL DAILY
Qty: 90 TABLET | Refills: 1 | Status: SHIPPED | OUTPATIENT
Start: 2022-03-07 | End: 2022-09-29 | Stop reason: SDUPTHER

## 2022-03-07 RX ORDER — ATORVASTATIN CALCIUM 40 MG/1
40 TABLET, FILM COATED ORAL
Qty: 90 TABLET | Refills: 3 | Status: SHIPPED | OUTPATIENT
Start: 2022-03-07 | End: 2022-09-29 | Stop reason: SDUPTHER

## 2022-03-07 NOTE — PROGRESS NOTES
SUBJECTIVE  Chief Complaint   Patient presents with    Hypertension    Other     IFG/prediabetes    Cholesterol Problem     Patient presents for follow up on their HTN and prediabetes. We reviewed their cardiac risk factors. Has seen cardiology. Had CT heart with a calcium score of 0. Was started on norvasc. No side effects. OBJECTIVE    Blood pressure 116/82, pulse 66, temperature 98.6 °F (37 °C), temperature source Temporal, resp. rate 14, height 5' 9\" (1.753 m), weight 172 lb (78 kg), SpO2 100 %. General:  Alert, cooperative, well appearing, in no apparent distress. CV:  The heart sounds are regular in rate and rhythm. There is a normal S1 and S2. There or no murmurs. Lungs: Inspiratory and expiratory efforts are full and unlabored. Ext: no swelling. Psych: normal affect. Mood good. Oriented x 3. Judgement and insight intact. Results for orders placed or performed in visit on 03/07/22   AMB POC HEMOGLOBIN A1C   Result Value Ref Range    Hemoglobin A1c (POC) 6.0 %       ASSESSMENT / PLAN    ICD-10-CM ICD-9-CM    1. Hypertension, unspecified type  I10 401.9 amLODIPine (NORVASC) 5 mg tablet      CBC WITH AUTOMATED DIFF      METABOLIC PANEL, COMPREHENSIVE      LIPID PANEL   2. Hypercholesterolemia  E78.00 272.0 atorvastatin (Lipitor) 40 mg tablet      LIPID PANEL   3. Prediabetes  R73.03 790.29 AMB POC HEMOGLOBIN A1C      HEMOGLOBIN A1C W/O EAG   4. Prostate cancer screening  Z12.5 V76.44 PSA SCREENING (SCREENING)     HTN -   Cont diet and exercise. Cont norvasc. Refills as needed. Hypercholesterolemia - cont Lipitor. Refills as needed. Labs in 6 months. Prediabetes  - improving A1c. Recheck in 6 months. Work on low carb dieting. Pt ed handouts provided. All chart history elements were reviewed by me at the time of the visit even though marked at time of note closure. Patient understands our medical plan. Patient has provided input and agrees with goals.  Alternatives have been explained and offered. All questions answered. The patient is to call if condition worsens or fails to improve. Follow-up and Dispositions    · Return in about 6 months (around 9/7/2022) for routine care. Fasting labs due 3-7 days prior to appointment.

## 2022-03-07 NOTE — PROGRESS NOTES
1. \"Have you been to the ER, urgent care clinic since your last visit? Hospitalized since your last visit? \" No    2. \"Have you seen or consulted any other health care providers outside of the [address] [place] [address] since your last visit? \" No     3. For patients aged 39-70: Has the patient had a colonoscopy / FIT/ Cologuard? Yes - no Care Gap present      If the patient is female:    4. For patients aged 41-77: Has the patient had a mammogram within the past 2 years? NA - based on age or sex      11. For patients aged 21-65: Has the patient had a pap smear?  NA - based on age or sex

## 2022-03-07 NOTE — PATIENT INSTRUCTIONS
A Healthy Lifestyle: Care Instructions  Your Care Instructions     A healthy lifestyle can help you feel good, stay at a healthy weight, and have plenty of energy for both work and play. A healthy lifestyle is something you can share with your whole family. A healthy lifestyle also can lower your risk for serious health problems, such as high blood pressure, heart disease, and diabetes. You can follow a few steps listed below to improve your health and the health of your family. Follow-up care is a key part of your treatment and safety. Be sure to make and go to all appointments, and call your doctor if you are having problems. It's also a good idea to know your test results and keep a list of the medicines you take. How can you care for yourself at home? · Do not eat too much sugar, fat, or fast foods. You can still have dessert and treats now and then. The goal is moderation. · Start small to improve your eating habits. Pay attention to portion sizes, drink less juice and soda pop, and eat more fruits and vegetables. ? Eat a healthy amount of food. A 3-ounce serving of meat, for example, is about the size of a deck of cards. Fill the rest of your plate with vegetables and whole grains. ? Limit the amount of soda and sports drinks you have every day. Drink more water when you are thirsty. ? Eat plenty of fruits and vegetables every day. Have an apple or some carrot sticks as an afternoon snack instead of a candy bar. Try to have fruits and/or vegetables at every meal.  · Make exercise part of your daily routine. You may want to start with simple activities, such as walking, bicycling, or slow swimming. Try to be active 30 to 60 minutes every day. You do not need to do all 30 to 60 minutes all at once. For example, you can exercise 3 times a day for 10 or 20 minutes.  Moderate exercise is safe for most people, but it is always a good idea to talk to your doctor before starting an exercise program.  · Keep moving. Lesa Dominguez the lawn, work in the garden, or [email or web address]. Take the stairs instead of the elevator at work. · If you smoke, quit. People who smoke have an increased risk for heart attack, stroke, cancer, and other lung illnesses. Quitting is hard, but there are ways to boost your chance of quitting tobacco for good. ? Use nicotine gum, patches, or lozenges. ? Ask your doctor about stop-smoking programs and medicines. ? Keep trying. In addition to reducing your risk of diseases in the future, you will notice some benefits soon after you stop using tobacco. If you have shortness of breath or asthma symptoms, they will likely get better within a few weeks after you quit. · Limit how much alcohol you drink. Moderate amounts of alcohol (up to 2 drinks a day for men, 1 drink a day for women) are okay. But drinking too much can lead to liver problems, high blood pressure, and other health problems. Family health  If you have a family, there are many things you can do together to improve your health. · Eat meals together as a family as often as possible. · Eat healthy foods. This includes fruits, vegetables, lean meats and dairy, and whole grains. · Include your family in your fitness plan. Most people think of activities such as jogging or tennis as the way to fitness, but there are many ways you and your family can be more active. Anything that makes you breathe hard and gets your heart pumping is exercise. Here are some tips:  ? Walk to do errands or to take your child to school or the bus.  ? Go for a family bike ride after dinner instead of watching TV. Where can you learn more? Go to http://www.gray.com/  Enter R215 in the search box to learn more about \"A Healthy Lifestyle: Care Instructions. \"  Current as of: June 16, 2021               Content Version: 13.0  © 8943-3077 [employer].    Care instructions adapted under license by [facility] (which disclaims liability or warranty for this information). If you have questions about a medical condition or this instruction, always ask your healthcare professional. [address] any warranty or liability for your use of this information. Learning About Carbohydrates  What are carbohydrates? Carbohydrates are an important nutrient you get from food. It's a great source of energy for your body and helps your brain and nervous system work properly. How does your body use carbohydrates? After you eat food with carbs in it, your body digests the carbohydrates and turns them into a kind of sugar that goes into your blood. The blood carries this sugar to the cells in your body. The cells use the sugar to give you energy. Extra sugar is stored in the cells for later use. If it isn't used, it turns into fat. What foods contain carbohydrates? The healthiest carbohydrate choices are breads, cereals, and pastas made with whole grains; brown rice; low-fat dairy products; vegetables; legumes such as peas, lentils, and beans; and fruits. Foods made from refined flour, including bread, pasta, doughnuts, cookies, and desserts, also contain carbohydrates. So do sweets such as candy and soda. How can you get the right kind and amount of carbs? Eating too much of anything can lead to weight gain. And that can lead to other health problems. Here are some tips to help you eat the right amount of the right kind of carbs so you have the nutrition and the energy you need:  · Eat 3 to 8 servings of grains (breads, cereals, rice, pasta) each day. For example, a serving is 1 slice of bread, 1 cup of boxed cereal, or ½ cup of cooked rice, cooked pasta, or cooked cereal. Go to www. choosemyplate.gov to learn how many servings you need. ? Buy bread that lists whole wheat (or other whole grains), stone-ground wheat, or cracked wheat as the first ingredient.   ? Eat brown rice, bulgur, or millet instead of white rice. ? Eat pasta and cereals made from whole grain flour instead of refined flour. · Eat several servings a day of fresh fruits and vegetables. These include raspberries, apples, figs, oranges, pears, prunes, broccoli, brussels sprouts, carrots, corn, peas, and beans. And there are lots of other fruits and vegetables to choose from. · Limit the amount of candy, desserts, and soda in your diet. Where can you learn more? Go to http://www.gray.com/  Enter F199 in the search box to learn more about \"Learning About Carbohydrates. \"  Current as of: December 17, 2020               Content Version: 13.0  © 7038-8053 [employer]. Care instructions adapted under license by [employer] (which disclaims liability or warranty for this information). If you have questions about a medical condition or this instruction, always ask your healthcare professional. [place] [address] any warranty or liability for your use of this information. Learning About Low-Carbohydrate Diets  What is a low-carbohydrate diet? A low-carbohydrate (or \"low-carb\") diet limits foods and drinks that have carbohydrates. This includes grains, fruits, milk and yogurt, and starchy vegetables like potatoes, beans, and corn. It also avoids foods and drinks that have added sugar. Instead, low-carb diets include foods that are high in protein and fat. Why might you follow a low-carb diet? Low-carb diets may be used for a variety of reasons, such as for weight loss. People who have diabetes may use a low-carb diet to help manage their blood sugar levels. What should you do before you start the diet? Talk to your doctor before you try any diet. This is even more important if you have health problems like kidney disease, heart disease, or diabetes. Your doctor may suggest that you meet with a registered dietitian.  A dietitian can help you make an eating plan that works for you. What foods do you eat on a low-carb diet? On a low-carb diet, you choose foods that are high in protein and fat. Examples of these are:  · Meat, poultry, and fish. · Eggs. · Nuts, such as walnuts, pecans, almonds, and peanuts. · Peanut butter and other nut butters. · Tofu. · Avocado. · Oskar Lights. · Non-starchy vegetables like broccoli, cauliflower, green beans, mushrooms, peppers, lettuce, and spinach. · Unsweetened non-dairy milks like almond milk and coconut milk. · Cheese, cottage cheese, and cream cheese. Current as of: December 17, 2020               Content Version: 13.0  © 2006-2021 [employer]. Care instructions adapted under license by [employer] (which disclaims liability or warranty for this information). If you have questions about a medical condition or this instruction, always ask your healthcare professional. [place] [address] any warranty or liability for your use of this information.

## 2022-03-19 PROBLEM — S86.012S ACHILLES RUPTURE, LEFT, SEQUELA: Status: ACTIVE | Noted: 2018-07-25

## 2022-09-24 ENCOUNTER — HOSPITAL ENCOUNTER (OUTPATIENT)
Dept: LAB | Age: 57
Discharge: HOME | End: 2022-09-24
Payer: OTHER GOVERNMENT

## 2022-09-24 DIAGNOSIS — E78.00 HYPERCHOLESTEROLEMIA: ICD-10-CM

## 2022-09-24 DIAGNOSIS — R73.03 PREDIABETES: ICD-10-CM

## 2022-09-24 DIAGNOSIS — Z12.5 PROSTATE CANCER SCREENING: ICD-10-CM

## 2022-09-24 DIAGNOSIS — I10 HYPERTENSION, UNSPECIFIED TYPE: ICD-10-CM

## 2022-09-24 LAB
ALBUMIN SERPL-MCNC: 4.1 G/DL (ref 3.4–5)
ALBUMIN/GLOB SERPL: 1.4 {RATIO} (ref 0.8–1.7)
ALP SERPL-CCNC: 78 U/L (ref 45–117)
ALT SERPL-CCNC: 23 U/L (ref 16–61)
ANION GAP SERPL CALC-SCNC: 4 MMOL/L (ref 3–18)
AST SERPL-CCNC: 12 U/L (ref 10–38)
BASOPHILS # BLD: 0 K/UL (ref 0–0.1)
BASOPHILS NFR BLD: 1 % (ref 0–2)
BILIRUB SERPL-MCNC: 0.5 MG/DL (ref 0.2–1)
BUN SERPL-MCNC: 12 MG/DL (ref 7–18)
BUN/CREAT SERPL: 11 (ref 12–20)
CALCIUM SERPL-MCNC: 9.4 MG/DL (ref 8.5–10.1)
CHLORIDE SERPL-SCNC: 109 MMOL/L (ref 100–111)
CHOLEST SERPL-MCNC: 136 MG/DL
CO2 SERPL-SCNC: 28 MMOL/L (ref 21–32)
CREAT SERPL-MCNC: 1.07 MG/DL (ref 0.6–1.3)
DIFFERENTIAL METHOD BLD: ABNORMAL
EOSINOPHIL # BLD: 0.1 K/UL (ref 0–0.4)
EOSINOPHIL NFR BLD: 2 % (ref 0–5)
ERYTHROCYTE [DISTWIDTH] IN BLOOD BY AUTOMATED COUNT: 13.5 % (ref 11.6–14.5)
GLOBULIN SER CALC-MCNC: 3 G/DL (ref 2–4)
GLUCOSE SERPL-MCNC: 106 MG/DL (ref 74–99)
HBA1C MFR BLD: 6 % (ref 4.2–5.6)
HCT VFR BLD AUTO: 37.7 % (ref 36–48)
HDLC SERPL-MCNC: 57 MG/DL (ref 40–60)
HDLC SERPL: 2.4 {RATIO} (ref 0–5)
HGB BLD-MCNC: 12.6 G/DL (ref 13–16)
IMM GRANULOCYTES # BLD AUTO: 0 K/UL (ref 0–0.04)
IMM GRANULOCYTES NFR BLD AUTO: 0 % (ref 0–0.5)
LDLC SERPL CALC-MCNC: 71.4 MG/DL (ref 0–100)
LIPID PROFILE,FLP: NORMAL
LYMPHOCYTES # BLD: 1.4 K/UL (ref 0.9–3.6)
LYMPHOCYTES NFR BLD: 37 % (ref 21–52)
MCH RBC QN AUTO: 29.1 PG (ref 24–34)
MCHC RBC AUTO-ENTMCNC: 33.4 G/DL (ref 31–37)
MCV RBC AUTO: 87.1 FL (ref 78–100)
MONOCYTES # BLD: 0.4 K/UL (ref 0.05–1.2)
MONOCYTES NFR BLD: 10 % (ref 3–10)
NEUTS SEG # BLD: 2 K/UL (ref 1.8–8)
NEUTS SEG NFR BLD: 51 % (ref 40–73)
NRBC # BLD: 0 K/UL (ref 0–0.01)
NRBC BLD-RTO: 0 PER 100 WBC
PLATELET # BLD AUTO: 236 K/UL (ref 135–420)
PMV BLD AUTO: 10.3 FL (ref 9.2–11.8)
POTASSIUM SERPL-SCNC: 4.3 MMOL/L (ref 3.5–5.5)
PROT SERPL-MCNC: 7.1 G/DL (ref 6.4–8.2)
PSA SERPL-MCNC: 1.1 NG/ML (ref 0–4)
RBC # BLD AUTO: 4.33 M/UL (ref 4.35–5.65)
SODIUM SERPL-SCNC: 141 MMOL/L (ref 136–145)
TRIGL SERPL-MCNC: 38 MG/DL (ref ?–150)
VLDLC SERPL CALC-MCNC: 7.6 MG/DL
WBC # BLD AUTO: 4 K/UL (ref 4.6–13.2)

## 2022-09-24 PROCEDURE — 80053 COMPREHEN METABOLIC PANEL: CPT

## 2022-09-24 PROCEDURE — 36415 COLL VENOUS BLD VENIPUNCTURE: CPT

## 2022-09-24 PROCEDURE — 83036 HEMOGLOBIN GLYCOSYLATED A1C: CPT

## 2022-09-24 PROCEDURE — 85025 COMPLETE CBC W/AUTO DIFF WBC: CPT

## 2022-09-24 PROCEDURE — 80061 LIPID PANEL: CPT

## 2022-09-24 PROCEDURE — 84153 ASSAY OF PSA TOTAL: CPT

## 2022-09-29 ENCOUNTER — OFFICE VISIT (OUTPATIENT)
Dept: FAMILY MEDICINE CLINIC | Age: 57
End: 2022-09-29
Payer: OTHER GOVERNMENT

## 2022-09-29 VITALS
TEMPERATURE: 97.5 F | BODY MASS INDEX: 25.48 KG/M2 | OXYGEN SATURATION: 99 % | HEIGHT: 69 IN | WEIGHT: 172 LBS | RESPIRATION RATE: 14 BRPM | SYSTOLIC BLOOD PRESSURE: 138 MMHG | HEART RATE: 60 BPM | DIASTOLIC BLOOD PRESSURE: 88 MMHG

## 2022-09-29 DIAGNOSIS — E78.00 HYPERCHOLESTEROLEMIA: ICD-10-CM

## 2022-09-29 DIAGNOSIS — Z23 ENCOUNTER FOR IMMUNIZATION: ICD-10-CM

## 2022-09-29 DIAGNOSIS — Z00.00 WELL ADULT EXAM: Primary | ICD-10-CM

## 2022-09-29 DIAGNOSIS — R73.03 PREDIABETES: ICD-10-CM

## 2022-09-29 DIAGNOSIS — I44.0 1ST DEGREE AV BLOCK: ICD-10-CM

## 2022-09-29 DIAGNOSIS — I10 HYPERTENSION, UNSPECIFIED TYPE: ICD-10-CM

## 2022-09-29 PROCEDURE — 90686 IIV4 VACC NO PRSV 0.5 ML IM: CPT | Performed by: FAMILY MEDICINE

## 2022-09-29 PROCEDURE — 90471 IMMUNIZATION ADMIN: CPT | Performed by: FAMILY MEDICINE

## 2022-09-29 PROCEDURE — 99396 PREV VISIT EST AGE 40-64: CPT | Performed by: FAMILY MEDICINE

## 2022-09-29 RX ORDER — ATORVASTATIN CALCIUM 40 MG/1
40 TABLET, FILM COATED ORAL
Qty: 90 TABLET | Refills: 3 | Status: SHIPPED | OUTPATIENT
Start: 2022-09-29

## 2022-09-29 RX ORDER — AMLODIPINE BESYLATE 5 MG/1
5 TABLET ORAL DAILY
Qty: 90 TABLET | Refills: 1 | Status: SHIPPED | OUTPATIENT
Start: 2022-09-29

## 2022-09-29 NOTE — PATIENT INSTRUCTIONS
Prediabetes: Care Instructions  Overview     Prediabetes is a warning sign that you're at risk for getting type 2 diabetes. It means that your blood sugar is higher than it should be. But it's not high enough to be diabetes. The food you eat naturally turns into sugar. Your body uses the sugar for energy. Normally, an organ called the pancreas makes insulin. And insulin allows the sugar in your blood to get into your body's cells. But sometimes the body can't use insulin the right way. So the sugar stays in your blood instead. This is called insulin resistance. The buildup of sugar in your blood means you have prediabetes. The good news is that you may be able to prevent or delay diabetes. Making small lifestyle changes, like getting active and changing your eating habits, may help you get your blood sugar back to normal. You can work with your doctor to make a treatment plan. Follow-up care is a key part of your treatment and safety. Be sure to make and go to all appointments, and call your doctor if you are having problems. It's also a good idea to know your test results and keep a list of the medicines you take. How can you care for yourself at home? Watch your weight. A healthy weight helps your body use insulin properly. Limit the amount of calories, sweets, and unhealthy fat you eat. Ask your doctor if you should see a dietitian. A registered dietitian can help you create meal plans that fit your lifestyle. Get at least 30 minutes of exercise on most days of the week. Exercise helps control your blood sugar. It also helps you maintain a healthy weight. Walking is a good choice. You also may want to do other activities, such as running, swimming, cycling, or playing tennis or team sports. Do not smoke. Smoking can make prediabetes worse. If you need help quitting, talk to your doctor about stop-smoking programs and medicines. These can increase your chances of quitting for good.   If your doctor prescribed medicines, take them exactly as prescribed. Call your doctor if you think you are having a problem with your medicine. You will get more details on the specific medicines your doctor prescribes. When should you call for help? Watch closely for changes in your health, and be sure to contact your doctor if:    You have any symptoms of diabetes. These may include:  Being thirsty more often. Urinating more. Being hungrier. Losing weight. Being very tired. Having blurry vision. You have a wound that will not heal.     You have an infection that will not go away. You have problems with your blood pressure. You want more information about diabetes and how you can keep from getting it. Where can you learn more? Go to http://www.gray.com/  Enter I222 in the search box to learn more about \"Prediabetes: Care Instructions. \"  Current as of: July 28, 2021               Content Version: 13.2  © 2006-2022 [employer]. Care instructions adapted under license by [employer] (which disclaims liability or warranty for this information). If you have questions about a medical condition or this instruction, always ask your healthcare professional. [place] [address] any warranty or liability for your use of this information. Well Visit, Men 48 to 72: Care Instructions  Overview     Well visits can help you stay healthy. Your doctor has checked your overall health and may have suggested ways to take good care of yourself. Your doctor also may have recommended tests. At home, you can help prevent illness with healthy eating, regular exercise, and other steps. Follow-up care is a key part of your treatment and safety. Be sure to make and go to all appointments, and call your doctor if you are having problems. It's also a good idea to know your test results and keep a list of the medicines you take.   How can you care for yourself at home?  Get screening tests that you and your doctor decide on. Screening helps find diseases before any symptoms appear. Eat healthy foods. Choose fruits, vegetables, whole grains, protein, and low-fat dairy foods. Limit fat, especially saturated fat. Reduce salt in your diet. Limit alcohol. Have no more than 2 drinks a day or 14 drinks a week. Get at least 30 minutes of exercise on most days of the week. Walking is a good choice. You also may want to do other activities, such as running, swimming, cycling, or playing tennis or team sports. Reach and stay at a healthy weight. This will lower your risk for many problems, such as obesity, diabetes, heart disease, and high blood pressure. Do not smoke. Smoking can make health problems worse. If you need help quitting, talk to your doctor about stop-smoking programs and medicines. These can increase your chances of quitting for good. Care for your mental health. It is easy to get weighed down by worry and stress. Learn strategies to manage stress, like deep breathing and mindfulness, and stay connected with your family and community. If you find you often feel sad or hopeless, talk with your doctor. Treatment can help. Talk to your doctor about whether you have any risk factors for sexually transmitted infections (STIs). You can help prevent STIs if you wait to have sex with a new partner (or partners) until you've each been tested for STIs. It also helps if you use condoms (male or female condoms) and if you limit your sex partners to one person who only has sex with you. Vaccines are available for some STIs. If it's important to you to prevent pregnancy with your partner, talk with your doctor about birth control options that might be best for you. If you think you may have a problem with alcohol or drug use, talk to your doctor. This includes prescription medicines (such as amphetamines and opioids) and illegal drugs (such as cocaine and methamphetamine). Your doctor can help you figure out what type of treatment is best for you. Protect your skin from too much sun. When you're outdoors from 10 a.m. to 4 p.m., stay in the shade or cover up with clothing and a hat with a wide brim. Wear sunglasses that block UV rays. Even when it's cloudy, put broad-spectrum sunscreen (SPF 30 or higher) on any exposed skin. See a dentist one or two times a year for checkups and to have your teeth cleaned. Wear a seat belt in the car. When should you call for help? Watch closely for changes in your health, and be sure to contact your doctor if you have any problems or symptoms that concern you. Where can you learn more? Go to http://archana-kaz.info/  Enter K810 in the search box to learn more about \"Well Visit, Men 48 to 72: Care Instructions. \"  Current as of: October 6, 2021               Content Version: 13.2  © 2006-2022 [employer]wise[employer] Inc[employer]. Care instructions adapted under license by [employer] (which disclaims liability or warranty for this information). If you have questions about a medical condition or this instruction, always ask your healthcare professional. [place] [address] any warranty or liability for your use of this information.

## 2022-09-29 NOTE — PROGRESS NOTES
1. \"Have you been to the ER, urgent care clinic since your last visit? Hospitalized since your last visit? \" No    2. \"Have you seen or consulted any other health care providers outside of the [address] [place] [address] since your last visit? \" No     3. For patients aged 39-70: Has the patient had a colonoscopy / FIT/ Cologuard? Yes - no Care Gap present-due 10/2028      If the patient is female:    4. For patients aged 41-77: Has the patient had a mammogram within the past 2 years? NA - based on age or sex      11. For patients aged 21-65: Has the patient had a pap smear? NA - based on age or sex    Physician order obtained. Patient completed adult immunization consent form. Allergies, contraindications and recommendations reviewed with patient. Seasonal influenza vaccine administered IM right deltoid. Patient tolerated well. Patient remained in office for 15 minutes after injection and no adverse reactions were noted.

## 2022-09-29 NOTE — PROGRESS NOTES
Chief Complaint   Patient presents with    Complete Physical    Hypertension    Cholesterol Problem      Subjective:   Janae Man is a 49 y.o. male presenting for his annual checkup. ROS:  Complete 10 system ROS is obtained from the patient which is negative. Specific concerns today:     Has bradycardia and 1st degree AV block on EKG and has seen cardiology who stated as needed follow-up. No chest pain or dyspnea on exertion. No lightheadedness or dizziness. Current Outpatient Medications   Medication Sig Dispense Refill    amLODIPine (NORVASC) 5 mg tablet Take 1 Tablet by mouth daily. 90 Tablet 1    atorvastatin (Lipitor) 40 mg tablet Take 1 Tablet by mouth nightly. 90 Tablet 3     No Known Allergies  Past Medical History:   Diagnosis Date    Achilles rupture, left 2017    Achilles rupture, right 2013    Body mass index (BMI) of 25.0 to 29.9     H/O colonoscopy 10/2018    Dr. Shelley Puente, no polyps, 10 year follow-up    Hypercholesterolemia     Prediabetes 2020     Past Surgical History:   Procedure Laterality Date    HX ORTHOPAEDIC  5/2/2013    right achilles repair    HX VASECTOMY  2002    HX WISDOM TEETH EXTRACTION  1985     Family History   Problem Relation Age of Onset    Schizophrenia Mother      Social History     Tobacco Use    Smoking status: Never    Smokeless tobacco: Never   Substance Use Topics    Alcohol use: No      Objective:     Blood pressure 138/88, pulse 60, temperature 97.5 °F (36.4 °C), temperature source Temporal, resp. rate 14, height 5' 9\" (1.753 m), weight 172 lb (78 kg), SpO2 99 %. The patient appears well, alert, oriented x 3, in no distress. ENT normal.  Neck supple. No adenopathy or thyromegaly. RADHA. Lungs are clear, good air entry, no wheezes, rhonchi or rales. S1 and S2 normal, no murmurs, regular rate and rhythm. Abdomen is soft without tenderness, guarding, mass or organomegaly. Extremities show no edema. Neurological is normal without focal findings. Psych: normal affect. Mood good. Oriented x 3. Judgement and insight intact. Results for orders placed or performed during the hospital encounter of 09/24/22   CBC WITH AUTOMATED DIFF   Result Value Ref Range    WBC 4.0 (L) 4.6 - 13.2 K/uL    RBC 4.33 (L) 4.35 - 5.65 M/uL    HGB 12.6 (L) 13.0 - 16.0 g/dL    HCT 37.7 36.0 - 48.0 %    MCV 87.1 78.0 - 100.0 FL    MCH 29.1 24.0 - 34.0 PG    MCHC 33.4 31.0 - 37.0 g/dL    RDW 13.5 11.6 - 14.5 %    PLATELET 048 555 - 759 K/uL    MPV 10.3 9.2 - 11.8 FL    NRBC 0.0 0  WBC    ABSOLUTE NRBC 0.00 0.00 - 0.01 K/uL    NEUTROPHILS 51 40 - 73 %    LYMPHOCYTES 37 21 - 52 %    MONOCYTES 10 3 - 10 %    EOSINOPHILS 2 0 - 5 %    BASOPHILS 1 0 - 2 %    IMMATURE GRANULOCYTES 0 0.0 - 0.5 %    ABS. NEUTROPHILS 2.0 1.8 - 8.0 K/UL    ABS. LYMPHOCYTES 1.4 0.9 - 3.6 K/UL    ABS. MONOCYTES 0.4 0.05 - 1.2 K/UL    ABS. EOSINOPHILS 0.1 0.0 - 0.4 K/UL    ABS. BASOPHILS 0.0 0.0 - 0.1 K/UL    ABS. IMM. GRANS. 0.0 0.00 - 0.04 K/UL    DF AUTOMATED     METABOLIC PANEL, COMPREHENSIVE   Result Value Ref Range    Sodium 141 136 - 145 mmol/L    Potassium 4.3 3.5 - 5.5 mmol/L    Chloride 109 100 - 111 mmol/L    CO2 28 21 - 32 mmol/L    Anion gap 4 3.0 - 18 mmol/L    Glucose 106 (H) 74 - 99 mg/dL    BUN 12 7.0 - 18 MG/DL    Creatinine 1.07 0.6 - 1.3 MG/DL    BUN/Creatinine ratio 11 (L) 12 - 20      GFR est AA >60 >60 ml/min/1.73m2    GFR est non-AA >60 >60 ml/min/1.73m2    Calcium 9.4 8.5 - 10.1 MG/DL    Bilirubin, total 0.5 0.2 - 1.0 MG/DL    ALT (SGPT) 23 16 - 61 U/L    AST (SGOT) 12 10 - 38 U/L    Alk.  phosphatase 78 45 - 117 U/L    Protein, total 7.1 6.4 - 8.2 g/dL    Albumin 4.1 3.4 - 5.0 g/dL    Globulin 3.0 2.0 - 4.0 g/dL    A-G Ratio 1.4 0.8 - 1.7     LIPID PANEL   Result Value Ref Range    LIPID PROFILE          Cholesterol, total 136 <200 MG/DL    Triglyceride 38 <150 MG/DL    HDL Cholesterol 57 40 - 60 MG/DL    LDL, calculated 71.4 0 - 100 MG/DL    VLDL, calculated 7.6 MG/DL CHOL/HDL Ratio 2.4 0 - 5.0     HEMOGLOBIN A1C W/O EAG   Result Value Ref Range    Hemoglobin A1c 6.0 (H) 4.2 - 5.6 %   PSA SCREENING (SCREENING)   Result Value Ref Range    Prostate Specific Ag 1.1 0.0 - 4.0 ng/mL     Assessment/Plan:       ICD-10-CM ICD-9-CM    1. Well adult exam  Z00.00 V70.0       2. Hypertension, unspecified type  I10 401.9 amLODIPine (NORVASC) 5 mg tablet      3. Hypercholesterolemia  E78.00 272.0 atorvastatin (Lipitor) 40 mg tablet      4. Prediabetes  R73.03 790.29       5. 1st degree AV block  I44.0 426.11         Age and sex specific counseling. Pt ed handouts. Diet and exercise due to BMI>25. Fasting labs reviewed. Refills of Lipitor and Norvasc. Reminded to consider shingles vaccine. All chart history elements were reviewed by me at the time of the visit even though marked at time of note closure. Patient understands our medical plan. Patient has provided input and agrees with goals. Alternatives have been explained and offered. All questions answered. The patient is to call if condition worsens or fails to improve. Follow-up and Dispositions    Return in about 6 months (around 3/29/2023) for routine care. A1c to be done in office.

## 2023-03-29 ENCOUNTER — OFFICE VISIT (OUTPATIENT)
Age: 58
End: 2023-03-29
Payer: OTHER GOVERNMENT

## 2023-03-29 VITALS
DIASTOLIC BLOOD PRESSURE: 88 MMHG | OXYGEN SATURATION: 98 % | BODY MASS INDEX: 25.04 KG/M2 | SYSTOLIC BLOOD PRESSURE: 116 MMHG | HEIGHT: 70 IN | TEMPERATURE: 99 F | RESPIRATION RATE: 18 BRPM | HEART RATE: 71 BPM

## 2023-03-29 DIAGNOSIS — Z12.5 ENCOUNTER FOR SCREENING FOR MALIGNANT NEOPLASM OF PROSTATE: ICD-10-CM

## 2023-03-29 DIAGNOSIS — I10 ESSENTIAL (PRIMARY) HYPERTENSION: Primary | ICD-10-CM

## 2023-03-29 DIAGNOSIS — I44.0 ATRIOVENTRICULAR BLOCK, FIRST DEGREE: ICD-10-CM

## 2023-03-29 DIAGNOSIS — E78.00 PURE HYPERCHOLESTEROLEMIA, UNSPECIFIED: ICD-10-CM

## 2023-03-29 DIAGNOSIS — R73.03 PREDIABETES: ICD-10-CM

## 2023-03-29 LAB — HBA1C MFR BLD: 5.6 %

## 2023-03-29 PROCEDURE — 3079F DIAST BP 80-89 MM HG: CPT | Performed by: FAMILY MEDICINE

## 2023-03-29 PROCEDURE — 83036 HEMOGLOBIN GLYCOSYLATED A1C: CPT | Performed by: FAMILY MEDICINE

## 2023-03-29 PROCEDURE — PBSHW AMB POC HEMOGLOBIN A1C: Performed by: FAMILY MEDICINE

## 2023-03-29 PROCEDURE — 99214 OFFICE O/P EST MOD 30 MIN: CPT | Performed by: FAMILY MEDICINE

## 2023-03-29 PROCEDURE — 3074F SYST BP LT 130 MM HG: CPT | Performed by: FAMILY MEDICINE

## 2023-03-29 RX ORDER — AMLODIPINE BESYLATE 5 MG/1
5 TABLET ORAL DAILY
Qty: 90 TABLET | Refills: 1 | Status: SHIPPED | OUTPATIENT
Start: 2023-03-29

## 2023-03-29 RX ORDER — ATORVASTATIN CALCIUM 40 MG/1
40 TABLET, FILM COATED ORAL NIGHTLY
Qty: 90 TABLET | Refills: 1 | Status: SHIPPED | OUTPATIENT
Start: 2023-03-29

## 2023-03-29 SDOH — ECONOMIC STABILITY: HOUSING INSECURITY
IN THE LAST 12 MONTHS, WAS THERE A TIME WHEN YOU DID NOT HAVE A STEADY PLACE TO SLEEP OR SLEPT IN A SHELTER (INCLUDING NOW)?: YES

## 2023-03-29 SDOH — ECONOMIC STABILITY: FOOD INSECURITY: WITHIN THE PAST 12 MONTHS, YOU WORRIED THAT YOUR FOOD WOULD RUN OUT BEFORE YOU GOT MONEY TO BUY MORE.: NEVER TRUE

## 2023-03-29 SDOH — ECONOMIC STABILITY: INCOME INSECURITY: HOW HARD IS IT FOR YOU TO PAY FOR THE VERY BASICS LIKE FOOD, HOUSING, MEDICAL CARE, AND HEATING?: NOT HARD AT ALL

## 2023-03-29 SDOH — ECONOMIC STABILITY: FOOD INSECURITY: WITHIN THE PAST 12 MONTHS, THE FOOD YOU BOUGHT JUST DIDN'T LAST AND YOU DIDN'T HAVE MONEY TO GET MORE.: NEVER TRUE

## 2023-03-29 ASSESSMENT — PATIENT HEALTH QUESTIONNAIRE - PHQ9
1. LITTLE INTEREST OR PLEASURE IN DOING THINGS: 0
SUM OF ALL RESPONSES TO PHQ QUESTIONS 1-9: 0
SUM OF ALL RESPONSES TO PHQ9 QUESTIONS 1 & 2: 0
2. FEELING DOWN, DEPRESSED OR HOPELESS: 0
SUM OF ALL RESPONSES TO PHQ QUESTIONS 1-9: 0

## 2023-03-29 NOTE — PATIENT INSTRUCTIONS
prescribed medicines, take them exactly as prescribed. Call your doctor if you think you are having a problem with your medicine. You will get more details on the specific medicines your doctor prescribes. When should you call for help? Watch closely for changes in your health, and be sure to contact your doctor if:    You have any symptoms of diabetes. These may include:  Being thirsty more often. Urinating more. Being hungrier. Losing weight. Being very tired. Having blurry vision. You have a wound that will not heal.     You have an infection that will not go away. You have problems with your blood pressure. You want more information about diabetes and how you can keep from getting it. Where can you learn more? Go to http://www.woods.com/ and enter I222 to learn more about \"Prediabetes: Care Instructions. \"  Current as of: April 13, 2022               Content Version: 13.5  © 2006-2022 Healthwise, Incorporated. Care instructions adapted under license by [facility] ([facility]). If you have questions about a medical condition or this instruction, always ask your healthcare professional. [address] any warranty or liability for your use of this information.

## 2023-03-29 NOTE — PROGRESS NOTES
Tj Partida presents today for   Chief Complaint   Patient presents with    Hypertension     1st Degree AV Block    Cholesterol Problem       /88 (Site: Right Upper Arm, Position: Sitting, Cuff Size: Medium Adult)   Pulse 71   Temp 99 °F (37.2 °C) (Temporal)   Resp 18   Ht 5' 9.5\" (1.765 m)   SpO2 98%   BMI 25.04 kg/m²      1. \"Have you been to the ER, urgent care clinic since your last visit? Hospitalized since your last visit? \" No    2. \"Have you seen or consulted any other health care providers outside of the [address] [place] [address] since your last visit? \" No     3. For patients aged 39-70: Has the patient had a colonoscopy / FIT/ Cologuard? Yes - no Care Gap present      If the patient is female:    4. For patients aged 41-77: Has the patient had a mammogram within the past 2 years? NA - based on age or sex      11. For patients aged 21-65: Has the patient had a pap smear?  NA - based on age or sex
patient is to call if condition worsens or fails to improve. Return in about 6 months (around 9/29/2023) for f/u fasting labs prior.

## 2023-10-03 ENCOUNTER — HOSPITAL ENCOUNTER (OUTPATIENT)
Facility: HOSPITAL | Age: 58
Discharge: HOME | End: 2023-10-06
Payer: OTHER GOVERNMENT

## 2023-10-03 LAB
ALBUMIN SERPL-MCNC: 4.2 G/DL (ref 3.4–5)
ALBUMIN/GLOB SERPL: 1.4 (ref 0.8–1.7)
ALP SERPL-CCNC: 72 U/L (ref 45–117)
ALT SERPL-CCNC: 22 U/L (ref 16–61)
ANION GAP SERPL CALC-SCNC: 4 MMOL/L (ref 3–18)
AST SERPL-CCNC: 10 U/L (ref 10–38)
BASOPHILS # BLD: 0 K/UL (ref 0–0.1)
BASOPHILS NFR BLD: 0 % (ref 0–2)
BILIRUB SERPL-MCNC: 0.8 MG/DL (ref 0.2–1)
BUN SERPL-MCNC: 10 MG/DL (ref 7–18)
BUN/CREAT SERPL: 10 (ref 12–20)
CALCIUM SERPL-MCNC: 9.3 MG/DL (ref 8.5–10.1)
CHLORIDE SERPL-SCNC: 106 MMOL/L (ref 100–111)
CHOLEST SERPL-MCNC: 135 MG/DL
CO2 SERPL-SCNC: 29 MMOL/L (ref 21–32)
CREAT SERPL-MCNC: 0.96 MG/DL (ref 0.6–1.3)
DIFFERENTIAL METHOD BLD: ABNORMAL
EOSINOPHIL # BLD: 0.1 K/UL (ref 0–0.4)
EOSINOPHIL NFR BLD: 2 % (ref 0–5)
ERYTHROCYTE [DISTWIDTH] IN BLOOD BY AUTOMATED COUNT: 14 % (ref 11.6–14.5)
EST. AVERAGE GLUCOSE BLD GHB EST-MCNC: 120 MG/DL
GLOBULIN SER CALC-MCNC: 3.1 G/DL (ref 2–4)
GLUCOSE SERPL-MCNC: 83 MG/DL (ref 74–99)
HBA1C MFR BLD: 5.8 % (ref 4.2–5.6)
HCT VFR BLD AUTO: 37.8 % (ref 36–48)
HDLC SERPL-MCNC: 60 MG/DL (ref 40–60)
HDLC SERPL: 2.3 (ref 0–5)
HGB BLD-MCNC: 12.6 G/DL (ref 13–16)
IMM GRANULOCYTES # BLD AUTO: 0 K/UL (ref 0–0.04)
IMM GRANULOCYTES NFR BLD AUTO: 0 % (ref 0–0.5)
LDLC SERPL CALC-MCNC: 65.8 MG/DL (ref 0–100)
LIPID PANEL: NORMAL
LYMPHOCYTES # BLD: 1.6 K/UL (ref 0.9–3.6)
LYMPHOCYTES NFR BLD: 36 % (ref 21–52)
MCH RBC QN AUTO: 29.8 PG (ref 24–34)
MCHC RBC AUTO-ENTMCNC: 33.3 G/DL (ref 31–37)
MCV RBC AUTO: 89.4 FL (ref 78–100)
MONOCYTES # BLD: 0.4 K/UL (ref 0.05–1.2)
MONOCYTES NFR BLD: 9 % (ref 3–10)
NEUTS SEG # BLD: 2.4 K/UL (ref 1.8–8)
NEUTS SEG NFR BLD: 53 % (ref 40–73)
NRBC # BLD: 0 K/UL (ref 0–0.01)
NRBC BLD-RTO: 0 PER 100 WBC
PLATELET # BLD AUTO: 244 K/UL (ref 135–420)
PMV BLD AUTO: 9.7 FL (ref 9.2–11.8)
POTASSIUM SERPL-SCNC: 4 MMOL/L (ref 3.5–5.5)
PROT SERPL-MCNC: 7.3 G/DL (ref 6.4–8.2)
PSA SERPL-MCNC: 1.7 NG/ML (ref 0–4)
RBC # BLD AUTO: 4.23 M/UL (ref 4.35–5.65)
SODIUM SERPL-SCNC: 139 MMOL/L (ref 136–145)
TRIGL SERPL-MCNC: 46 MG/DL
VLDLC SERPL CALC-MCNC: 9.2 MG/DL
WBC # BLD AUTO: 4.6 K/UL (ref 4.6–13.2)

## 2023-10-03 PROCEDURE — 80061 LIPID PANEL: CPT

## 2023-10-03 PROCEDURE — 36415 COLL VENOUS BLD VENIPUNCTURE: CPT

## 2023-10-03 PROCEDURE — 80053 COMPREHEN METABOLIC PANEL: CPT

## 2023-10-03 PROCEDURE — G0103 PSA SCREENING: HCPCS

## 2023-10-03 PROCEDURE — 83036 HEMOGLOBIN GLYCOSYLATED A1C: CPT

## 2023-10-03 PROCEDURE — 85025 COMPLETE CBC W/AUTO DIFF WBC: CPT

## 2023-10-04 ENCOUNTER — OFFICE VISIT (OUTPATIENT)
Age: 58
End: 2023-10-04
Payer: OTHER GOVERNMENT

## 2023-10-04 VITALS
SYSTOLIC BLOOD PRESSURE: 110 MMHG | OXYGEN SATURATION: 98 % | HEART RATE: 55 BPM | DIASTOLIC BLOOD PRESSURE: 80 MMHG | BODY MASS INDEX: 24.64 KG/M2 | WEIGHT: 172.13 LBS | RESPIRATION RATE: 18 BRPM | TEMPERATURE: 98 F | HEIGHT: 70 IN

## 2023-10-04 DIAGNOSIS — I10 ESSENTIAL (PRIMARY) HYPERTENSION: ICD-10-CM

## 2023-10-04 DIAGNOSIS — R73.03 PREDIABETES: ICD-10-CM

## 2023-10-04 DIAGNOSIS — I44.0 ATRIOVENTRICULAR BLOCK, FIRST DEGREE: ICD-10-CM

## 2023-10-04 DIAGNOSIS — E78.00 PURE HYPERCHOLESTEROLEMIA, UNSPECIFIED: ICD-10-CM

## 2023-10-04 DIAGNOSIS — Z23 ENCOUNTER FOR IMMUNIZATION: ICD-10-CM

## 2023-10-04 DIAGNOSIS — Z00.00 WELL ADULT EXAM: Primary | ICD-10-CM

## 2023-10-04 PROBLEM — G47.33 OBSTRUCTIVE SLEEP APNEA: Status: ACTIVE | Noted: 2023-10-04

## 2023-10-04 PROCEDURE — 90674 CCIIV4 VAC NO PRSV 0.5 ML IM: CPT | Performed by: FAMILY MEDICINE

## 2023-10-04 PROCEDURE — 3074F SYST BP LT 130 MM HG: CPT | Performed by: FAMILY MEDICINE

## 2023-10-04 PROCEDURE — 99396 PREV VISIT EST AGE 40-64: CPT | Performed by: FAMILY MEDICINE

## 2023-10-04 PROCEDURE — 3079F DIAST BP 80-89 MM HG: CPT | Performed by: FAMILY MEDICINE

## 2023-10-04 PROCEDURE — PBSHW INFLUENZA, FLUCELVAX, (AGE 6 MO+), IM, PF, 0.5 ML: Performed by: FAMILY MEDICINE

## 2023-10-04 RX ORDER — AMLODIPINE BESYLATE 5 MG/1
5 TABLET ORAL DAILY
Qty: 90 TABLET | Refills: 1 | Status: SHIPPED | OUTPATIENT
Start: 2023-10-04

## 2023-10-04 RX ORDER — ATORVASTATIN CALCIUM 40 MG/1
40 TABLET, FILM COATED ORAL NIGHTLY
Qty: 90 TABLET | Refills: 1 | Status: SHIPPED | OUTPATIENT
Start: 2023-10-04

## 2023-10-04 NOTE — PROGRESS NOTES
Chief Complaint   Patient presents with    Annual Exam    Results     Review of results       1. \"Have you been to the ER, urgent care clinic since your last visit? Hospitalized since your last visit? \" No    2. \"Have you seen or consulted any other health care providers outside of the [address] [place] [address] since your last visit? \" No     3. For patients aged 43-73: Has the patient had a colonoscopy / FIT/ Cologuard? Yes - no Care Gap present due 10/19/2028    Physician order obtained. Patient completed adult immunization consent form. Allergies, contraindications and recommendations reviewed with patient. Seasonal influenza vaccine administered IM right deltoid. Patient tolerated well. Patient remained in office for 15 minutes after injection and no adverse reactions were noted.      Lot # [id number]  Exp Date: 06/30/2024  [facility] # [id number]
Orthopox (Smallpox/Monkeypox) Vaccine  Completed    Hepatitis A vaccine  Aged Out    Hib vaccine  Aged Out    Meningococcal (ACWY) vaccine  Aged Out    Pneumococcal 0-64 years Vaccine  Aged Out    Hepatitis C screen  Discontinued    HIV screen  Discontinued     Recommendations for Preventive Services Due: see orders and patient instructions/AVS.

## 2024-04-02 RX ORDER — AMLODIPINE BESYLATE 5 MG/1
5 TABLET ORAL DAILY
Qty: 90 TABLET | Refills: 0 | Status: SHIPPED | OUTPATIENT
Start: 2024-04-02

## 2024-04-02 RX ORDER — ATORVASTATIN CALCIUM 40 MG/1
40 TABLET, FILM COATED ORAL NIGHTLY
Qty: 90 TABLET | Refills: 0 | Status: SHIPPED | OUTPATIENT
Start: 2024-04-02

## 2024-04-10 NOTE — PATIENT INSTRUCTIONS
tablespoon jelly or jam, 1/2 cup sorbet, or 1 cup of lemonade.  Eat less than 2,300 milligrams (mg) of sodium a day. If you limit your sodium to 1,500 mg a day, you can lower your blood pressure even more.  Be aware that all of these are the suggested number of servings for people who eat 1,800 to 2,000 calories a day. Your recommended number of servings may be different if you need more or fewer calories.  Tips for success  Start small. Make small changes, and stick with them. Once those changes become habit, add a few more changes.  Try some of the following:  Make it a goal to eat a fruit or vegetable at every meal and at snacks. This will make it easy to get the recommended amount of fruits and vegetables each day.  Try yogurt topped with fruit and nuts for a snack or healthy dessert.  Add lettuce, tomato, cucumber, and onion to sandwiches.  Have a variety of cut-up vegetables with a low-fat dip as an appetizer instead of chips and dip.  Sprinkle sunflower seeds or chopped almonds over salads. Or try adding chopped walnuts or almonds to cooked vegetables.  Try some vegetarian meals using beans and peas. Add garbanzo or kidney beans to salads. Make burritos and tacos with mashed bernabe beans or black beans.  Where can you learn more?  Go to https://www.TruckTrack.net/patientEd and enter H967 to learn more about \"DASH Diet: Care Instructions.\"  Current as of: September 20, 2023               Content Version: 14.0  © 2465-3789 [employer].   Care instructions adapted under license by [employer]. If you have questions about a medical condition or this instruction, always ask your healthcare professional. [employer] disclaims any warranty or liability for your use of this information.       Patient Education        Prediabetes: Care Instructions  Overview     Prediabetes is a warning sign that you're at risk for getting type 2 diabetes. It means that your blood sugar is higher than it

## 2024-04-10 NOTE — PROGRESS NOTES
\"Have you been to the ER, urgent care clinic since your last visit?  Hospitalized since your last visit?\"    NO    “Have you seen or consulted any other health care providers outside of [facility] since your last visit?”    NO            Click Here for Release of Records Request

## 2024-04-15 ENCOUNTER — OFFICE VISIT (OUTPATIENT)
Age: 59
End: 2024-04-15
Payer: OTHER GOVERNMENT

## 2024-04-15 VITALS
HEART RATE: 68 BPM | WEIGHT: 173 LBS | OXYGEN SATURATION: 98 % | BODY MASS INDEX: 24.77 KG/M2 | HEIGHT: 70 IN | DIASTOLIC BLOOD PRESSURE: 76 MMHG | TEMPERATURE: 98.3 F | RESPIRATION RATE: 18 BRPM | SYSTOLIC BLOOD PRESSURE: 106 MMHG

## 2024-04-15 DIAGNOSIS — R73.03 PREDIABETES: ICD-10-CM

## 2024-04-15 DIAGNOSIS — E78.00 PURE HYPERCHOLESTEROLEMIA, UNSPECIFIED: ICD-10-CM

## 2024-04-15 DIAGNOSIS — I10 ESSENTIAL (PRIMARY) HYPERTENSION: Primary | ICD-10-CM

## 2024-04-15 DIAGNOSIS — Z12.5 ENCOUNTER FOR SCREENING FOR MALIGNANT NEOPLASM OF PROSTATE: ICD-10-CM

## 2024-04-15 DIAGNOSIS — I44.0 ATRIOVENTRICULAR BLOCK, FIRST DEGREE: ICD-10-CM

## 2024-04-15 LAB — HBA1C MFR BLD: 6.1 %

## 2024-04-15 PROCEDURE — 99214 OFFICE O/P EST MOD 30 MIN: CPT | Performed by: FAMILY MEDICINE

## 2024-04-15 PROCEDURE — 3078F DIAST BP <80 MM HG: CPT | Performed by: FAMILY MEDICINE

## 2024-04-15 PROCEDURE — 83036 HEMOGLOBIN GLYCOSYLATED A1C: CPT | Performed by: FAMILY MEDICINE

## 2024-04-15 PROCEDURE — 3074F SYST BP LT 130 MM HG: CPT | Performed by: FAMILY MEDICINE

## 2024-04-15 RX ORDER — AMLODIPINE BESYLATE 5 MG/1
5 TABLET ORAL DAILY
Qty: 90 TABLET | Refills: 1 | Status: SHIPPED | OUTPATIENT
Start: 2024-04-15

## 2024-04-15 RX ORDER — ATORVASTATIN CALCIUM 40 MG/1
40 TABLET, FILM COATED ORAL NIGHTLY
Qty: 90 TABLET | Refills: 1 | Status: SHIPPED | OUTPATIENT
Start: 2024-04-15

## 2024-04-15 SDOH — ECONOMIC STABILITY: HOUSING INSECURITY
IN THE LAST 12 MONTHS, WAS THERE A TIME WHEN YOU DID NOT HAVE A STEADY PLACE TO SLEEP OR SLEPT IN A SHELTER (INCLUDING NOW)?: NO

## 2024-04-15 SDOH — ECONOMIC STABILITY: FOOD INSECURITY: WITHIN THE PAST 12 MONTHS, YOU WORRIED THAT YOUR FOOD WOULD RUN OUT BEFORE YOU GOT MONEY TO BUY MORE.: NEVER TRUE

## 2024-04-15 SDOH — ECONOMIC STABILITY: FOOD INSECURITY: WITHIN THE PAST 12 MONTHS, THE FOOD YOU BOUGHT JUST DIDN'T LAST AND YOU DIDN'T HAVE MONEY TO GET MORE.: SOMETIMES TRUE

## 2024-04-15 SDOH — ECONOMIC STABILITY: INCOME INSECURITY: HOW HARD IS IT FOR YOU TO PAY FOR THE VERY BASICS LIKE FOOD, HOUSING, MEDICAL CARE, AND HEATING?: NOT HARD AT ALL

## 2024-04-15 ASSESSMENT — PATIENT HEALTH QUESTIONNAIRE - PHQ9
2. FEELING DOWN, DEPRESSED OR HOPELESS: NOT AT ALL
SUM OF ALL RESPONSES TO PHQ QUESTIONS 1-9: 0
1. LITTLE INTEREST OR PLEASURE IN DOING THINGS: NOT AT ALL
SUM OF ALL RESPONSES TO PHQ QUESTIONS 1-9: 0
SUM OF ALL RESPONSES TO PHQ9 QUESTIONS 1 & 2: 0

## 2024-04-15 NOTE — PROGRESS NOTES
SUBJECTIVE  Chief Complaint   Patient presents with    Cholesterol Problem    Hypertension    PRE-DM      Patient presents for follow up on their HTN and prediabetes.      Has bradycardia and 1st degree AV block on EKG and has seen cardiology who stated as needed follow-up.   No chest pain or dyspnea on exertion.   No lightheadedness or dizziness. Had CT heart with a calcium score of 0.  Was started on norvasc which we are prescribing.  No side effects.       Due for A1c today.    OBJECTIVE  Blood pressure 106/76, pulse 68, temperature 98.3 °F (36.8 °C), temperature source Temporal, resp. rate 18, height 1.765 m (5' 9.5\"), weight 78.5 kg (173 lb), SpO2 98 %.     General:  Alert, cooperative, well appearing, in no apparent distress.  CV:  The heart sounds are regular in rate and rhythm.  There is a normal S1 and S2.  There or no murmurs.  Lungs:  Inspiratory and expiratory efforts are full and unlabored.   Ext: no swelling.     Results for orders placed or performed in visit on 04/15/24   AMB POC HEMOGLOBIN A1C   Result Value Ref Range    Hemoglobin A1C, POC 6.1 %     ASSESSMENT / PLAN   Diagnosis Orders   1. Essential (primary) hypertension  amLODIPine (NORVASC) 5 MG tablet    CBC with Auto Differential    Comprehensive Metabolic Panel    Lipid Panel      2. Atrioventricular block, first degree        3. Pure hypercholesterolemia, unspecified  atorvastatin (LIPITOR) 40 MG tablet    Lipid Panel      4. Prediabetes  AMB POC HEMOGLOBIN A1C    Hemoglobin A1C      5. Encounter for screening for malignant neoplasm of prostate  PSA Screening        HTN / AV block -   Cont diet and exercise.  Cont norvasc.  Refills sent.      Hypercholesterolemia - cont Lipitor.  Refills as needed.  Labs in 6 months.     Prediabetes  - worsening A1c.  Recheck in 6 months.  Work on low carb dieting.      All chart history elements were reviewed by me at the time of the visit even though marked at time of note closure. Patient understands our

## 2024-10-08 ENCOUNTER — HOSPITAL ENCOUNTER (OUTPATIENT)
Facility: HOSPITAL | Age: 59
Discharge: HOME | End: 2024-10-11
Payer: OTHER GOVERNMENT

## 2024-10-08 DIAGNOSIS — E78.00 PURE HYPERCHOLESTEROLEMIA, UNSPECIFIED: ICD-10-CM

## 2024-10-08 DIAGNOSIS — R73.03 PREDIABETES: ICD-10-CM

## 2024-10-08 DIAGNOSIS — I10 ESSENTIAL (PRIMARY) HYPERTENSION: ICD-10-CM

## 2024-10-08 DIAGNOSIS — Z12.5 ENCOUNTER FOR SCREENING FOR MALIGNANT NEOPLASM OF PROSTATE: ICD-10-CM

## 2024-10-08 LAB
ALBUMIN SERPL-MCNC: 4.4 G/DL (ref 3.4–5)
ALBUMIN/GLOB SERPL: 1.3 (ref 0.8–1.7)
ALP SERPL-CCNC: 85 U/L (ref 45–117)
ALT SERPL-CCNC: 19 U/L (ref 16–61)
ANION GAP SERPL CALC-SCNC: 3 MMOL/L (ref 3–18)
AST SERPL-CCNC: 10 U/L (ref 10–38)
BASOPHILS # BLD: 0 K/UL (ref 0–0.1)
BASOPHILS NFR BLD: 0 % (ref 0–2)
BILIRUB SERPL-MCNC: 0.8 MG/DL (ref 0.2–1)
BUN SERPL-MCNC: 12 MG/DL (ref 7–18)
BUN/CREAT SERPL: 13 (ref 12–20)
CALCIUM SERPL-MCNC: 9.1 MG/DL (ref 8.5–10.1)
CHLORIDE SERPL-SCNC: 108 MMOL/L (ref 100–111)
CHOLEST SERPL-MCNC: 150 MG/DL
CO2 SERPL-SCNC: 29 MMOL/L (ref 21–32)
CREAT SERPL-MCNC: 0.96 MG/DL (ref 0.6–1.3)
DIFFERENTIAL METHOD BLD: NORMAL
EOSINOPHIL # BLD: 0.1 K/UL (ref 0–0.4)
EOSINOPHIL NFR BLD: 2 % (ref 0–5)
ERYTHROCYTE [DISTWIDTH] IN BLOOD BY AUTOMATED COUNT: 13.6 % (ref 11.6–14.5)
EST. AVERAGE GLUCOSE BLD GHB EST-MCNC: 128 MG/DL
GLOBULIN SER CALC-MCNC: 3.3 G/DL (ref 2–4)
GLUCOSE SERPL-MCNC: 87 MG/DL (ref 74–99)
HBA1C MFR BLD: 6.1 % (ref 4.2–5.6)
HCT VFR BLD AUTO: 40.2 % (ref 36–48)
HDLC SERPL-MCNC: 60 MG/DL (ref 40–60)
HDLC SERPL: 2.5 (ref 0–5)
HGB BLD-MCNC: 13.4 G/DL (ref 13–16)
IMM GRANULOCYTES # BLD AUTO: 0 K/UL (ref 0–0.04)
IMM GRANULOCYTES NFR BLD AUTO: 0 % (ref 0–0.5)
LDLC SERPL CALC-MCNC: 80.4 MG/DL (ref 0–100)
LIPID PANEL: NORMAL
LYMPHOCYTES # BLD: 1.5 K/UL (ref 0.9–3.6)
LYMPHOCYTES NFR BLD: 32 % (ref 21–52)
MCH RBC QN AUTO: 29.8 PG (ref 24–34)
MCHC RBC AUTO-ENTMCNC: 33.3 G/DL (ref 31–37)
MCV RBC AUTO: 89.3 FL (ref 78–100)
MONOCYTES # BLD: 0.4 K/UL (ref 0.05–1.2)
MONOCYTES NFR BLD: 8 % (ref 3–10)
NEUTS SEG # BLD: 2.7 K/UL (ref 1.8–8)
NEUTS SEG NFR BLD: 57 % (ref 40–73)
NRBC # BLD: 0 K/UL (ref 0–0.01)
NRBC BLD-RTO: 0 PER 100 WBC
PLATELET # BLD AUTO: 255 K/UL (ref 135–420)
PMV BLD AUTO: 10 FL (ref 9.2–11.8)
POTASSIUM SERPL-SCNC: 4.3 MMOL/L (ref 3.5–5.5)
PROT SERPL-MCNC: 7.7 G/DL (ref 6.4–8.2)
PSA SERPL-MCNC: 1.4 NG/ML (ref 0–4)
RBC # BLD AUTO: 4.5 M/UL (ref 4.35–5.65)
SODIUM SERPL-SCNC: 140 MMOL/L (ref 136–145)
TRIGL SERPL-MCNC: 48 MG/DL
VLDLC SERPL CALC-MCNC: 9.6 MG/DL
WBC # BLD AUTO: 4.7 K/UL (ref 4.6–13.2)

## 2024-10-08 PROCEDURE — 85025 COMPLETE CBC W/AUTO DIFF WBC: CPT

## 2024-10-08 PROCEDURE — 36415 COLL VENOUS BLD VENIPUNCTURE: CPT

## 2024-10-08 PROCEDURE — G0103 PSA SCREENING: HCPCS

## 2024-10-08 PROCEDURE — 80061 LIPID PANEL: CPT

## 2024-10-08 PROCEDURE — 83036 HEMOGLOBIN GLYCOSYLATED A1C: CPT

## 2024-10-08 PROCEDURE — 80053 COMPREHEN METABOLIC PANEL: CPT

## 2024-10-15 NOTE — PROGRESS NOTES
Chief Complaint   Patient presents with    Results     Review of results     Hypertension     History of 1st degree AV-Block     Cholesterol Problem    PRE-DM      \"Have you been to the ER, urgent care clinic since your last visit?  Hospitalized since your last visit?\"    NO    “Have you seen or consulted any other health care providers outside our system since your last visit?”    NO    Physician order obtained. Patient completed adult immunization consent form.  Allergies, contraindications and recommendations reviewed with patient. Seasonal influenza vaccine administered IM left deltoid.  Patient tolerated well.  Patient remained in office for 15 minutes after injection and no adverse reactions were noted.     Lot # [id number]  Exp Date: 06/17/2025  [facility] # 85600-750-13

## 2024-10-15 NOTE — PATIENT INSTRUCTIONS
professional. [employer] disclaims any warranty or liability for your use of this information.

## 2024-10-16 ENCOUNTER — OFFICE VISIT (OUTPATIENT)
Facility: CLINIC | Age: 59
End: 2024-10-16
Payer: OTHER GOVERNMENT

## 2024-10-16 VITALS
WEIGHT: 170 LBS | TEMPERATURE: 98 F | SYSTOLIC BLOOD PRESSURE: 116 MMHG | RESPIRATION RATE: 18 BRPM | BODY MASS INDEX: 24.34 KG/M2 | HEART RATE: 75 BPM | HEIGHT: 70 IN | OXYGEN SATURATION: 98 % | DIASTOLIC BLOOD PRESSURE: 70 MMHG

## 2024-10-16 DIAGNOSIS — R73.03 PREDIABETES: ICD-10-CM

## 2024-10-16 DIAGNOSIS — I44.0 ATRIOVENTRICULAR BLOCK, FIRST DEGREE: ICD-10-CM

## 2024-10-16 DIAGNOSIS — E78.00 PURE HYPERCHOLESTEROLEMIA, UNSPECIFIED: ICD-10-CM

## 2024-10-16 DIAGNOSIS — I10 ESSENTIAL (PRIMARY) HYPERTENSION: Primary | ICD-10-CM

## 2024-10-16 DIAGNOSIS — Z23 NEED FOR IMMUNIZATION AGAINST INFLUENZA: ICD-10-CM

## 2024-10-16 PROCEDURE — 3078F DIAST BP <80 MM HG: CPT | Performed by: FAMILY MEDICINE

## 2024-10-16 PROCEDURE — 3074F SYST BP LT 130 MM HG: CPT | Performed by: FAMILY MEDICINE

## 2024-10-16 PROCEDURE — 90661 CCIIV3 VAC ABX FR 0.5 ML IM: CPT | Performed by: FAMILY MEDICINE

## 2024-10-16 PROCEDURE — 99214 OFFICE O/P EST MOD 30 MIN: CPT | Performed by: FAMILY MEDICINE

## 2024-10-16 PROCEDURE — 90471 IMMUNIZATION ADMIN: CPT | Performed by: FAMILY MEDICINE

## 2024-10-16 RX ORDER — AMLODIPINE BESYLATE 5 MG/1
5 TABLET ORAL DAILY
Qty: 90 TABLET | Refills: 1 | Status: SHIPPED | OUTPATIENT
Start: 2024-10-16

## 2024-10-16 RX ORDER — ATORVASTATIN CALCIUM 40 MG/1
40 TABLET, FILM COATED ORAL NIGHTLY
Qty: 90 TABLET | Refills: 1 | Status: SHIPPED | OUTPATIENT
Start: 2024-10-16

## 2024-10-16 ASSESSMENT — PATIENT HEALTH QUESTIONNAIRE - PHQ9
SUM OF ALL RESPONSES TO PHQ QUESTIONS 1-9: 0
2. FEELING DOWN, DEPRESSED OR HOPELESS: NOT AT ALL
SUM OF ALL RESPONSES TO PHQ9 QUESTIONS 1 & 2: 0
SUM OF ALL RESPONSES TO PHQ QUESTIONS 1-9: 0
1. LITTLE INTEREST OR PLEASURE IN DOING THINGS: NOT AT ALL

## 2024-10-16 NOTE — PROGRESS NOTES
SUBJECTIVE  Chief Complaint   Patient presents with    Results     Review of results     Hypertension     History of 1st degree AV-Block     Cholesterol Problem    PRE-DM      Patient presents for follow up on their HTN and prediabetes.    No complaints, feels great.    Has bradycardia and 1st degree AV block on EKG and has seen cardiology who stated as needed follow-up.   No chest pain or dyspnea on exertion.   No lightheadedness or dizziness. Had CT heart with a calcium score of 0.  Taking norvasc.  No side effects.       OBJECTIVE  Blood pressure 116/70, pulse 75, temperature 98 °F (36.7 °C), temperature source Tympanic, resp. rate 18, height 1.765 m (5' 9.5\"), weight 77.1 kg (170 lb), SpO2 98%.     General:  Alert, cooperative, well appearing, in no apparent distress.  CV:  The heart sounds are regular in rate and rhythm.  There is a normal S1 and S2.  There or no murmurs.  Lungs:  Inspiratory and expiratory efforts are full and unlabored.   Ext: no swelling.      Latest Reference Range & Units 10/08/24 11:45   Sodium 136 - 145 mmol/L 140   Potassium 3.5 - 5.5 mmol/L 4.3   Chloride 100 - 111 mmol/L 108   CARBON DIOXIDE 21 - 32 mmol/L 29   BUN,BUNPL 7.0 - 18 MG/DL 12   Creatinine 0.6 - 1.3 MG/DL 0.96   Bun/Cre 12 - 20   13   Anion Gap 3.0 - 18 mmol/L 3   Est, Glom Filt Rate >60 ml/min/1.73m2 >90   Glucose 74 - 99 mg/dL 87   Calcium 8.5 - 10.1 MG/DL 9.1   Albumin/Globulin Ratio 0.8 - 1.7   1.3   Total Protein 6.4 - 8.2 g/dL 7.7   LIPID PANEL -       Chol/HDL Ratio 0 - 5.0   2.5   Cholesterol, Total <200 MG/   HDL Cholesterol 40 - 60 MG/DL 60   LDL Cholesterol 0 - 100 MG/DL 80.4   Triglycerides <150 MG/DL 48   VLDL MG/DL 9.6   Albumin 3.4 - 5.0 g/dL 4.4   Globulin 2.0 - 4.0 g/dL 3.3   Alkaline Phosphatase 45 - 117 U/L 85   ALT 16 - 61 U/L 19   AST 10 - 38 U/L 10   Total Bilirubin 0.2 - 1.0 MG/DL 0.8   Hemoglobin A1C 4.2 - 5.6 % 6.1 (H)   eAG (mg/dL) mg/dL 128   WBC 4.6 - 13.2 K/uL 4.7   RBC 4.35 - 5.65 M/uL

## 2025-04-16 ENCOUNTER — OFFICE VISIT (OUTPATIENT)
Facility: CLINIC | Age: 60
End: 2025-04-16
Payer: OTHER GOVERNMENT

## 2025-04-16 VITALS
WEIGHT: 177 LBS | RESPIRATION RATE: 18 BRPM | TEMPERATURE: 98.2 F | OXYGEN SATURATION: 98 % | HEART RATE: 69 BPM | DIASTOLIC BLOOD PRESSURE: 74 MMHG | BODY MASS INDEX: 25.34 KG/M2 | SYSTOLIC BLOOD PRESSURE: 112 MMHG | HEIGHT: 70 IN

## 2025-04-16 DIAGNOSIS — I10 ESSENTIAL (PRIMARY) HYPERTENSION: Primary | ICD-10-CM

## 2025-04-16 DIAGNOSIS — R73.03 PREDIABETES: ICD-10-CM

## 2025-04-16 DIAGNOSIS — E78.00 PURE HYPERCHOLESTEROLEMIA, UNSPECIFIED: ICD-10-CM

## 2025-04-16 DIAGNOSIS — I44.0 ATRIOVENTRICULAR BLOCK, FIRST DEGREE: ICD-10-CM

## 2025-04-16 LAB — HBA1C MFR BLD: 5.9 %

## 2025-04-16 PROCEDURE — 83036 HEMOGLOBIN GLYCOSYLATED A1C: CPT | Performed by: FAMILY MEDICINE

## 2025-04-16 PROCEDURE — 3074F SYST BP LT 130 MM HG: CPT | Performed by: FAMILY MEDICINE

## 2025-04-16 PROCEDURE — 3078F DIAST BP <80 MM HG: CPT | Performed by: FAMILY MEDICINE

## 2025-04-16 PROCEDURE — 99214 OFFICE O/P EST MOD 30 MIN: CPT | Performed by: FAMILY MEDICINE

## 2025-04-16 RX ORDER — ATORVASTATIN CALCIUM 40 MG/1
40 TABLET, FILM COATED ORAL NIGHTLY
Qty: 90 TABLET | Refills: 1 | Status: SHIPPED | OUTPATIENT
Start: 2025-04-16

## 2025-04-16 RX ORDER — AMLODIPINE BESYLATE 5 MG/1
5 TABLET ORAL DAILY
Qty: 90 TABLET | Refills: 1 | Status: SHIPPED | OUTPATIENT
Start: 2025-04-16

## 2025-04-16 SDOH — ECONOMIC STABILITY: INCOME INSECURITY: IN THE LAST 12 MONTHS, WAS THERE A TIME WHEN YOU WERE NOT ABLE TO PAY THE MORTGAGE OR RENT ON TIME?: NO

## 2025-04-16 SDOH — ECONOMIC STABILITY: TRANSPORTATION INSECURITY
IN THE PAST 12 MONTHS, HAS LACK OF TRANSPORTATION KEPT YOU FROM MEETINGS, WORK, OR FROM GETTING THINGS NEEDED FOR DAILY LIVING?: NO

## 2025-04-16 SDOH — ECONOMIC STABILITY: FOOD INSECURITY: WITHIN THE PAST 12 MONTHS, THE FOOD YOU BOUGHT JUST DIDN'T LAST AND YOU DIDN'T HAVE MONEY TO GET MORE.: NEVER TRUE

## 2025-04-16 SDOH — ECONOMIC STABILITY: FOOD INSECURITY: WITHIN THE PAST 12 MONTHS, YOU WORRIED THAT YOUR FOOD WOULD RUN OUT BEFORE YOU GOT MONEY TO BUY MORE.: NEVER TRUE

## 2025-04-16 SDOH — ECONOMIC STABILITY: TRANSPORTATION INSECURITY
IN THE PAST 12 MONTHS, HAS THE LACK OF TRANSPORTATION KEPT YOU FROM MEDICAL APPOINTMENTS OR FROM GETTING MEDICATIONS?: NO

## 2025-04-16 NOTE — PROGRESS NOTES
Chief Complaint   Patient presents with    Hyperlipidemia    Hypertension    PRE-DM      \"Have you been to the ER, urgent care clinic since your last visit?  Hospitalized since your last visit?\"    NO    “Have you seen or consulted any other health care providers outside our system since your last visit?”    NO    No updated immunization noted on [place] Immunization Registry as of 04/16/2025

## 2025-04-16 NOTE — PROGRESS NOTES
SUBJECTIVE  Chief Complaint   Patient presents with    Hyperlipidemia    Hypertension    PRE-DM      Patient presents for follow up on their HTN and prediabetes.      Has bradycardia and 1st degree AV block on EKG and has seen cardiology who stated as needed follow-up.   No chest pain or dyspnea on exertion.   No lightheadedness or dizziness. Had CT heart with a calcium score of 0.  Taking norvasc.  No side effects.       OBJECTIVE  Blood pressure 112/74, pulse 69, temperature 98.2 °F (36.8 °C), temperature source Skin, resp. rate 18, height 1.765 m (5' 9.5\"), weight 80.3 kg (177 lb), SpO2 98%.     General:  Alert, cooperative, well appearing, in no apparent distress.  CV:  The heart sounds are regular in rate and rhythm.  There is a normal S1 and S2.  There or no murmurs.  Lungs:  Inspiratory and expiratory efforts are full and unlabored.   Ext: no swelling.     Results for orders placed or performed in visit on 04/16/25   AMB POC HEMOGLOBIN A1C   Result Value Ref Range    Hemoglobin A1C, POC 5.9 %     ASSESSMENT / PLAN   Diagnosis Orders   1. Essential (primary) hypertension  CBC with Auto Differential    Comprehensive Metabolic Panel    amLODIPine (NORVASC) 5 MG tablet      2. Atrioventricular block, first degree        3. Prediabetes  AMB POC HEMOGLOBIN A1C    Hemoglobin A1C      4. Pure hypercholesterolemia, unspecified  Comprehensive Metabolic Panel    Lipid Panel    atorvastatin (LIPITOR) 40 MG tablet        HTN / AV block -   Cont diet and exercise.  Cont norvasc.  Refills sent.      Hypercholesterolemia - cont Lipitor.  Refills sent in.    Prediabetes  - mildly elevated A1c but better than last check.  Recheck in 6 months.  Work on low carb dieting.      Declines vaccines.    All chart history elements were reviewed by me at the time of the visit even though marked at time of note closure. Patient understands our medical plan. Patient has provided input and agrees with goals. Alternatives have been

## 2025-04-16 NOTE — PATIENT INSTRUCTIONS
